# Patient Record
Sex: FEMALE | Race: WHITE | NOT HISPANIC OR LATINO | Employment: PART TIME | URBAN - NONMETROPOLITAN AREA
[De-identification: names, ages, dates, MRNs, and addresses within clinical notes are randomized per-mention and may not be internally consistent; named-entity substitution may affect disease eponyms.]

---
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
## 2020-01-08 ENCOUNTER — APPOINTMENT (OUTPATIENT)
Dept: GENERAL RADIOLOGY | Facility: HOSPITAL | Age: 20
End: 2020-01-08

## 2020-01-08 ENCOUNTER — HOSPITAL ENCOUNTER (EMERGENCY)
Facility: HOSPITAL | Age: 20
Discharge: HOME | End: 2020-01-09
Attending: EMERGENCY MEDICINE | Admitting: EMERGENCY MEDICINE

## 2020-01-08 DIAGNOSIS — M94.0 COSTOCHONDRITIS: Primary | ICD-10-CM

## 2020-01-08 LAB
AMPHET+METHAMPHET UR QL: NEGATIVE
AMPHETAMINES UR QL: NEGATIVE
B-HCG UR QL: NEGATIVE
BACTERIA UR QL AUTO: NORMAL /HPF
BARBITURATES UR QL SCN: NEGATIVE
BASOPHILS # BLD AUTO: 0.04 10*3/MM3 (ref 0–0.2)
BASOPHILS NFR BLD AUTO: 0.4 % (ref 0–1.5)
BENZODIAZ UR QL SCN: NEGATIVE
BILIRUB UR QL STRIP: NEGATIVE
BUPRENORPHINE SERPL-MCNC: NEGATIVE NG/ML
CANNABINOIDS SERPL QL: NEGATIVE
CLARITY UR: CLEAR
COCAINE UR QL: NEGATIVE
COLOR UR: YELLOW
DEPRECATED RDW RBC AUTO: 37.4 FL (ref 37–54)
EOSINOPHIL # BLD AUTO: 0.1 10*3/MM3 (ref 0–0.4)
EOSINOPHIL NFR BLD AUTO: 1 % (ref 0.3–6.2)
ERYTHROCYTE [DISTWIDTH] IN BLOOD BY AUTOMATED COUNT: 11.9 % (ref 12.3–15.4)
GLUCOSE UR STRIP-MCNC: NEGATIVE MG/DL
HCT VFR BLD AUTO: 36.4 % (ref 34–46.6)
HGB BLD-MCNC: 12.4 G/DL (ref 12–15.9)
HGB UR QL STRIP.AUTO: NEGATIVE
HYALINE CASTS UR QL AUTO: NORMAL /LPF
IMM GRANULOCYTES # BLD AUTO: 0.04 10*3/MM3 (ref 0–0.05)
IMM GRANULOCYTES NFR BLD AUTO: 0.4 % (ref 0–0.5)
KETONES UR QL STRIP: NEGATIVE
LEUKOCYTE ESTERASE UR QL STRIP.AUTO: ABNORMAL
LYMPHOCYTES # BLD AUTO: 2.64 10*3/MM3 (ref 0.7–3.1)
LYMPHOCYTES NFR BLD AUTO: 25.5 % (ref 19.6–45.3)
MCH RBC QN AUTO: 29.6 PG (ref 26.6–33)
MCHC RBC AUTO-ENTMCNC: 34.1 G/DL (ref 31.5–35.7)
MCV RBC AUTO: 86.9 FL (ref 79–97)
METHADONE UR QL SCN: NEGATIVE
MONOCYTES # BLD AUTO: 0.57 10*3/MM3 (ref 0.1–0.9)
MONOCYTES NFR BLD AUTO: 5.5 % (ref 5–12)
NEUTROPHILS # BLD AUTO: 6.96 10*3/MM3 (ref 1.7–7)
NEUTROPHILS NFR BLD AUTO: 67.2 % (ref 42.7–76)
NITRITE UR QL STRIP: NEGATIVE
NRBC BLD AUTO-RTO: 0 /100 WBC (ref 0–0.2)
OPIATES UR QL: NEGATIVE
OXYCODONE UR QL SCN: NEGATIVE
PCP UR QL SCN: NEGATIVE
PH UR STRIP.AUTO: 6.5 [PH] (ref 5–9)
PLATELET # BLD AUTO: 295 10*3/MM3 (ref 140–450)
PMV BLD AUTO: 10.4 FL (ref 6–12)
PROPOXYPH UR QL: NEGATIVE
PROT UR QL STRIP: NEGATIVE
RBC # BLD AUTO: 4.19 10*6/MM3 (ref 3.77–5.28)
RBC # UR: NORMAL /HPF
REF LAB TEST METHOD: NORMAL
SP GR UR STRIP: 1.01 (ref 1–1.03)
SQUAMOUS #/AREA URNS HPF: NORMAL /HPF
TRICYCLICS UR QL SCN: NEGATIVE
UROBILINOGEN UR QL STRIP: ABNORMAL
WBC NRBC COR # BLD: 10.35 10*3/MM3 (ref 3.4–10.8)
WBC UR QL AUTO: NORMAL /HPF

## 2020-01-08 PROCEDURE — 85025 COMPLETE CBC W/AUTO DIFF WBC: CPT | Performed by: EMERGENCY MEDICINE

## 2020-01-08 PROCEDURE — 71046 X-RAY EXAM CHEST 2 VIEWS: CPT

## 2020-01-08 PROCEDURE — 80306 DRUG TEST PRSMV INSTRMNT: CPT | Performed by: EMERGENCY MEDICINE

## 2020-01-08 PROCEDURE — 96374 THER/PROPH/DIAG INJ IV PUSH: CPT

## 2020-01-08 PROCEDURE — 80053 COMPREHEN METABOLIC PANEL: CPT | Performed by: EMERGENCY MEDICINE

## 2020-01-08 PROCEDURE — 93010 ELECTROCARDIOGRAM REPORT: CPT | Performed by: INTERNAL MEDICINE

## 2020-01-08 PROCEDURE — 25010000002 KETOROLAC TROMETHAMINE PER 15 MG: Performed by: EMERGENCY MEDICINE

## 2020-01-08 PROCEDURE — 84484 ASSAY OF TROPONIN QUANT: CPT | Performed by: EMERGENCY MEDICINE

## 2020-01-08 PROCEDURE — 99283 EMERGENCY DEPT VISIT LOW MDM: CPT

## 2020-01-08 PROCEDURE — 93005 ELECTROCARDIOGRAM TRACING: CPT

## 2020-01-08 PROCEDURE — 81001 URINALYSIS AUTO W/SCOPE: CPT | Performed by: EMERGENCY MEDICINE

## 2020-01-08 PROCEDURE — 93005 ELECTROCARDIOGRAM TRACING: CPT | Performed by: EMERGENCY MEDICINE

## 2020-01-08 PROCEDURE — 81025 URINE PREGNANCY TEST: CPT | Performed by: EMERGENCY MEDICINE

## 2020-01-08 RX ORDER — SODIUM CHLORIDE 0.9 % (FLUSH) 0.9 %
10 SYRINGE (ML) INJECTION AS NEEDED
Status: DISCONTINUED | OUTPATIENT
Start: 2020-01-08 | End: 2020-01-09 | Stop reason: HOSPADM

## 2020-01-08 RX ORDER — KETOROLAC TROMETHAMINE 15 MG/ML
15 INJECTION, SOLUTION INTRAMUSCULAR; INTRAVENOUS ONCE
Status: COMPLETED | OUTPATIENT
Start: 2020-01-08 | End: 2020-01-08

## 2020-01-08 RX ADMIN — KETOROLAC TROMETHAMINE 15 MG: 15 INJECTION, SOLUTION INTRAMUSCULAR; INTRAVENOUS at 23:57

## 2020-01-08 RX ADMIN — Medication 10 ML: at 23:36

## 2020-01-09 VITALS
BODY MASS INDEX: 27.97 KG/M2 | RESPIRATION RATE: 18 BRPM | DIASTOLIC BLOOD PRESSURE: 68 MMHG | HEART RATE: 79 BPM | OXYGEN SATURATION: 98 % | HEIGHT: 62 IN | TEMPERATURE: 97.3 F | WEIGHT: 152 LBS | SYSTOLIC BLOOD PRESSURE: 109 MMHG

## 2020-01-09 LAB
ALBUMIN SERPL-MCNC: 4.3 G/DL (ref 3.5–5.2)
ALBUMIN/GLOB SERPL: 1.4 G/DL
ALP SERPL-CCNC: 61 U/L (ref 39–117)
ALT SERPL W P-5'-P-CCNC: 16 U/L (ref 1–33)
ANION GAP SERPL CALCULATED.3IONS-SCNC: 12 MMOL/L (ref 5–15)
AST SERPL-CCNC: 21 U/L (ref 1–32)
BILIRUB SERPL-MCNC: 0.2 MG/DL (ref 0.2–1.2)
BUN BLD-MCNC: 11 MG/DL (ref 6–20)
BUN/CREAT SERPL: 15.1 (ref 7–25)
CALCIUM SPEC-SCNC: 9.2 MG/DL (ref 8.6–10.5)
CHLORIDE SERPL-SCNC: 103 MMOL/L (ref 98–107)
CO2 SERPL-SCNC: 25 MMOL/L (ref 22–29)
CREAT BLD-MCNC: 0.73 MG/DL (ref 0.57–1)
GFR SERPL CREATININE-BSD FRML MDRD: 103 ML/MIN/1.73
GLOBULIN UR ELPH-MCNC: 3 GM/DL
GLUCOSE BLD-MCNC: 110 MG/DL (ref 65–99)
POTASSIUM BLD-SCNC: 3.8 MMOL/L (ref 3.5–5.2)
PROT SERPL-MCNC: 7.3 G/DL (ref 6–8.5)
SODIUM BLD-SCNC: 140 MMOL/L (ref 136–145)
TROPONIN T SERPL-MCNC: <0.01 NG/ML (ref 0–0.03)

## 2020-01-09 NOTE — ED PROVIDER NOTES
Subjective   19-year-old previously healthy female presents emergency department with complaint of midsternal chest pain that does not radiate.  Began a couple days ago and has been intermittent.  Recent URI symptoms.  Recent cough.  Concerned because of family history in her father heart disease.  Denies any shortness of breath.  States the chest hurts with pressure or breathing.  No known injury.  Denies pregnancy.  Denies nausea, vomiting, diarrhea, fever or chills.    Family history, surgical history, social history, current medications and allergies are reviewed with the patient and triage documentation and vitals are reviewed.        History provided by:  Patient   used: No        Review of Systems   Constitutional: Negative for chills, diaphoresis and fever.   HENT: Negative for congestion, ear pain, sinus pressure and sinus pain.    Eyes: Negative.    Respiratory: Negative for cough, shortness of breath and wheezing.    Cardiovascular: Positive for chest pain. Negative for palpitations and leg swelling.   Gastrointestinal: Negative for abdominal pain, constipation, diarrhea, nausea and vomiting.   Endocrine: Negative.    Genitourinary: Negative for dysuria, frequency and urgency.   Musculoskeletal: Negative for arthralgias, back pain, myalgias and neck pain.   Skin: Negative for color change, pallor, rash and wound.   Allergic/Immunologic: Negative.    Neurological: Negative.    Hematological: Negative.    Psychiatric/Behavioral: Negative.        Past Medical History:   Diagnosis Date   • Acute pharyngitis    • Acute sinusitis    • Allergic asthma     IgE-mediated allergic asthma   • Allergic rhinitis    • Allergic rhinitis due to pollen    • Attention deficit hyperactivity disorder    • Common cold    • Cough    • Diarrhea    • Foreign body of cornea, right     Foreign body - corneal right eye   • Headache     not ocular   • History of influenza    • History of otitis media    • Impacted  "cerumen, left ear    • Impetigo    • Myopia    • Nausea    • Nausea and vomiting    • Need for immunization against influenza     \"Needs influenza immunization\"   • Obstructive sleep apnea syndrome     Previous   • Pain in right wrist    • Pain in throat    • Rhinitis    • Rib pain    • Screening for mental retardation     Mental retardation screening - Extremely low level of intellectual functioning   • Upper respiratory infection    • Worried well        Allergies   Allergen Reactions   • Penicillins Rash       Past Surgical History:   Procedure Laterality Date   • TONSILLECTOMY AND ADENOIDECTOMY  04/24/2006    T&A (1) - Obstructive sleep apnea syndrome.       Family History   Problem Relation Age of Onset   • No Known Problems Mother    • Diabetes Father    • Hypertension Father    • Asthma Sister    • No Known Problems Brother    • No Known Problems Maternal Grandmother    • No Known Problems Maternal Grandfather    • Diabetes Paternal Grandmother    • Hypertension Paternal Grandfather        Social History     Socioeconomic History   • Marital status: Single     Spouse name: Not on file   • Number of children: Not on file   • Years of education: Not on file   • Highest education level: Not on file   Tobacco Use   • Smoking status: Never Smoker   • Smokeless tobacco: Never Used   Substance and Sexual Activity   • Alcohol use: No   • Drug use: No   • Sexual activity: Never           Objective   Physical Exam   Constitutional: She is oriented to person, place, and time. She appears well-developed and well-nourished.  Non-toxic appearance. She does not appear ill. No distress.   HENT:   Head: Normocephalic.   Eyes: Pupils are equal, round, and reactive to light.   Neck: Normal range of motion. Neck supple. No hepatojugular reflux and no JVD present.   Cardiovascular: Normal rate and regular rhythm.  No extrasystoles are present.   No murmur heard.  Pulses:       Radial pulses are 2+ on the right side, and 2+ on the " left side.        Dorsalis pedis pulses are 2+ on the right side, and 2+ on the left side.   Pulmonary/Chest: Effort normal and breath sounds normal. No accessory muscle usage. No respiratory distress. She has no decreased breath sounds. She has no wheezes. She has no rhonchi. She has no rales. She exhibits tenderness.   Abdominal: Soft. Bowel sounds are normal. There is no tenderness. There is no rebound and no guarding.   Musculoskeletal: Normal range of motion.        Right lower leg: Normal. She exhibits no tenderness and no edema.        Left lower leg: Normal. She exhibits no tenderness and no edema.   Neurological: She is alert and oriented to person, place, and time.   Skin: Skin is warm and dry. Capillary refill takes less than 2 seconds.   Psychiatric: She has a normal mood and affect. Her behavior is normal.   Nursing note and vitals reviewed.      Procedures  none         ED Course      Labs Reviewed   COMPREHENSIVE METABOLIC PANEL - Abnormal; Notable for the following components:       Result Value    Glucose 110 (*)     All other components within normal limits    Narrative:     GFR Normal >60  Chronic Kidney Disease <60  Kidney Failure <15     CBC WITH AUTO DIFFERENTIAL - Abnormal; Notable for the following components:    RDW 11.9 (*)     All other components within normal limits   URINALYSIS W/ MICROSCOPIC IF INDICATED (NO CULTURE) - Abnormal; Notable for the following components:    Leuk Esterase, UA Trace (*)     All other components within normal limits   TROPONIN (IN-HOUSE) - Normal    Narrative:     Troponin T Reference Range:  <= 0.03 ng/mL-   Negative for AMI  >0.03 ng/mL-     Abnormal for myocardial necrosis.  Clinicians would have to utilize clinical acumen, EKG, Troponin and serial changes to determine if it is an Acute Myocardial Infarction or myocardial injury due to an underlying chronic condition.       Results may be falsely decreased if patient taking Biotin.     PREGNANCY, URINE -  Normal   URINE DRUG SCREEN - Normal    Narrative:     Cutoff For Drugs Screened:    Amphetamines               500 ng/ml  Barbiturates               200 ng/ml  Benzodiazepines            150 ng/ml  Cocaine                    150 ng/ml  Methadone                  200 ng/ml  Opiates                    100 ng/ml  Phencyclidine               25 ng/ml  THC                            50 ng/ml  Methamphetamine            500 ng/ml  Tricyclic Antidepressants  300 ng/ml  Oxycodone                  100 ng/ml  Propoxyphene               300 ng/ml  Buprenorphine               10 ng/ml    The normal value for all drugs tested is negative. This report includes unconfirmed screening results, with the cutoff values listed, to be used for medical treatment purposes only.  Unconfirmed results must not be used for non-medical purposes such as employment or legal testing.  Clinical consideration should be applied to any drug of abuse test, particularly when unconfirmed results are used.     URINALYSIS, MICROSCOPIC ONLY   CBC AND DIFFERENTIAL    Narrative:     The following orders were created for panel order CBC & Differential.  Procedure                               Abnormality         Status                     ---------                               -----------         ------                     CBC Auto Differential[[id number]]        Abnormal            Final result                 Please view results for these tests on the individual orders.     Xr Chest 2 View    Result Date: 1/8/2020  Narrative: Exam: PA lateral chest INDICATION: Chest pain COMPARISON: 10/4/2018 FINDINGS: PA lateral chest. Mild thoracic dextroscoliosis. The cardiomediastinal silhouette is unremarkable. The lungs are clear. No pneumothorax or pleural effusion.     Impression: No acute cardiopulmonary pulmonary abnormality. Electronically signed by:  Rainer Jacobsen MD  1/8/2020 11:58 PM CST Workstation: [id number]    EKG January 8, 2020 at 2135 reveals normal sinus  rhythm rate 88 bpm.  T wave flattening in 3 and aVF as well as V3 without T wave inversion.  No [place] depression.  No evidence of acute ischemia.          MDM  Number of Diagnoses or Management Options  Costochondritis:      Amount and/or Complexity of Data Reviewed  Clinical lab tests: reviewed  Tests in the radiology section of CPT®: reviewed  Tests in the medicine section of CPT®: reviewed    Patient Progress  Patient progress: stable    Laboratory studies and imaging unremarkable.  EKG normal.  Patient's pain reproducible to palpation.  Likely costochondritis given recent URI and cough.  Advised on symptomatic treatment with NSAIDs and moist heat.  Agreeable to discharge.    Final diagnoses:   Costochondritis            Domingo Shahid,   01/09/20 0652

## 2020-01-09 NOTE — DISCHARGE INSTRUCTIONS
Please return with new or worsening symptoms.  Use ibuprofen and tylenol as well as heating pad to help with discomfort.  Follow-up with primary care.

## 2020-01-20 ENCOUNTER — OFFICE VISIT (OUTPATIENT)
Dept: FAMILY MEDICINE CLINIC | Facility: CLINIC | Age: 20
End: 2020-01-20

## 2020-01-20 VITALS
OXYGEN SATURATION: 99 % | SYSTOLIC BLOOD PRESSURE: 110 MMHG | TEMPERATURE: 99.8 F | WEIGHT: 153 LBS | DIASTOLIC BLOOD PRESSURE: 70 MMHG | BODY MASS INDEX: 28.16 KG/M2 | HEART RATE: 84 BPM | HEIGHT: 62 IN

## 2020-01-20 DIAGNOSIS — Z23 FLU VACCINE NEED: ICD-10-CM

## 2020-01-20 DIAGNOSIS — Z30.9 ENCOUNTER FOR CONTRACEPTIVE MANAGEMENT, UNSPECIFIED TYPE: ICD-10-CM

## 2020-01-20 DIAGNOSIS — J06.9 UPPER RESPIRATORY TRACT INFECTION, UNSPECIFIED TYPE: ICD-10-CM

## 2020-01-20 DIAGNOSIS — G44.89 HEADACHE SYNDROME: Primary | ICD-10-CM

## 2020-01-20 PROCEDURE — 96372 THER/PROPH/DIAG INJ SC/IM: CPT | Performed by: STUDENT IN AN ORGANIZED HEALTH CARE EDUCATION/TRAINING PROGRAM

## 2020-01-20 PROCEDURE — 99213 OFFICE O/P EST LOW 20 MIN: CPT | Performed by: STUDENT IN AN ORGANIZED HEALTH CARE EDUCATION/TRAINING PROGRAM

## 2020-01-20 PROCEDURE — 90471 IMMUNIZATION ADMIN: CPT | Performed by: STUDENT IN AN ORGANIZED HEALTH CARE EDUCATION/TRAINING PROGRAM

## 2020-01-20 PROCEDURE — 90674 CCIIV4 VAC NO PRSV 0.5 ML IM: CPT | Performed by: STUDENT IN AN ORGANIZED HEALTH CARE EDUCATION/TRAINING PROGRAM

## 2020-01-20 RX ORDER — MEDROXYPROGESTERONE ACETATE 150 MG/ML
150 INJECTION, SUSPENSION INTRAMUSCULAR ONCE
Status: COMPLETED | OUTPATIENT
Start: 2020-01-20 | End: 2020-01-20

## 2020-01-20 RX ORDER — AZITHROMYCIN 250 MG/1
TABLET, FILM COATED ORAL
Qty: 6 TABLET | Refills: 0 | OUTPATIENT
Start: 2020-01-20 | End: 2020-01-29

## 2020-01-20 RX ADMIN — MEDROXYPROGESTERONE ACETATE 150 MG: 150 INJECTION, SUSPENSION INTRAMUSCULAR at 10:44

## 2020-01-20 NOTE — PROGRESS NOTES
I have seen the patient.  I have reviewed the notes, assessments, and/or procedures performed by Mile Mcmanus MD, I concur with her/his documentation and assessment and plan for Rubi Hennessy.               This document has been electronically signed by Saroj Bhatti MD on January 20, 2020 1:52 PM

## 2020-01-20 NOTE — PROGRESS NOTES
"     Family Medicine Residency   Mile Mcmanus MD    Rubi Hennessy is a 19 y.o. female who presents to family medicine residency clinic for the following:    She is here today for follow-up for migraines without aura. She has been taking verapamil and her headaches are well controlled. She is not having to use Maxalt. She has a headache today that she states is because she broke her glasses and has not had them.     She is now sexually active. She cannot remember to take OCP, so she would like to try the Depo shot. She does not want to be screened for any sexually transmitted diseases today. She reports one lifetime partner.     She has had cough/congestion for 1-2 weeks. She has been to the urgent care and given cough medicine. Sister is also sick. She has tried humidifier. Has had some nose bleeds. No fevers, but she does endorse chills.       Past Medical Hx:   Past Medical History:   Diagnosis Date   • Acute pharyngitis    • Acute sinusitis    • Allergic asthma     IgE-mediated allergic asthma   • Allergic rhinitis    • Allergic rhinitis due to pollen    • Attention deficit hyperactivity disorder    • Common cold    • Cough    • Diarrhea    • Foreign body of cornea, right     Foreign body - corneal right eye   • Headache     not ocular   • History of influenza    • History of otitis media    • Impacted cerumen, left ear    • Impetigo    • Myopia    • Nausea    • Nausea and vomiting    • Need for immunization against influenza     \"Needs influenza immunization\"   • Obstructive sleep apnea syndrome     Previous   • Pain in right wrist    • Pain in throat    • Rhinitis    • Rib pain    • Screening for mental retardation     Mental retardation screening - Extremely low level of intellectual functioning   • Upper respiratory infection    • Worried well        Past Surgical Hx:  Past Surgical History:   Procedure Laterality Date   • TONSILLECTOMY AND ADENOIDECTOMY  04/24/2006    T&A (1) - " Obstructive sleep apnea syndrome.       Current Meds:    Current Outpatient Medications:   •  ipratropium (ATROVENT) 0.06 % nasal spray, 2 sprays each nostril 3-4 times a day for cold symptoms., Disp: 1 each, Rfl: 0  •  ondansetron ODT (ZOFRAN-ODT) 4 MG disintegrating tablet, Take 1 tablet by mouth Every 8 (Eight) Hours As Needed for Nausea for up to 8 doses., Disp: 8 tablet, Rfl: 0  •  rizatriptan (MAXALT) 5 MG tablet, Take 1 tablet by mouth 1 (One) Time As Needed for Migraine for up to 1 dose. May repeat in 2 hours if needed, Disp: 9 tablet, Rfl: 0  •  verapamil SR (CALAN-SR) 120 MG CR tablet, Take 1 tablet by mouth Every Night., Disp: 30 tablet, Rfl: 0  •  azithromycin (ZITHROMAX) 250 MG tablet, Take 2 tablets the first day, then 1 tablet daily for 4 days., Disp: 6 tablet, Rfl: 0    Current Facility-Administered Medications:   •  medroxyPROGESTERone (DEPO-PROVERA) injection 150 mg, 150 mg, Intramuscular, Once, Mile Mcmanus MD    Allergies:  Penicillins    Family Hx:  Family History   Problem Relation Age of Onset   • No Known Problems Mother    • Diabetes Father    • Hypertension Father    • Asthma Sister    • No Known Problems Brother    • No Known Problems Maternal Grandmother    • No Known Problems Maternal Grandfather    • Diabetes Paternal Grandmother    • Hypertension Paternal Grandfather         Social History:  Social History     Socioeconomic History   • Marital status: Single     Spouse name: Not on file   • Number of children: Not on file   • Years of education: Not on file   • Highest education level: Not on file   Tobacco Use   • Smoking status: Never Smoker   • Smokeless tobacco: Never Used   Substance and Sexual Activity   • Alcohol use: No   • Drug use: No   • Sexual activity: Never       Review of Systems  Review of Systems   Constitutional: Negative for chills, diaphoresis and fever.   HENT: Positive for congestion and sore throat. Negative for sneezing.    Eyes: Negative for pain and  discharge.   Respiratory: Positive for cough. Negative for shortness of breath.    Gastrointestinal: Negative for constipation, diarrhea, nausea and vomiting.   Endocrine: Negative for cold intolerance and heat intolerance.   Genitourinary: Negative for difficulty urinating, dysuria, frequency and urgency.   Musculoskeletal: Negative for arthralgias and myalgias.   Skin: Negative for color change and pallor.   Allergic/Immunologic: Negative for environmental allergies and food allergies.   Neurological: Positive for headaches. Negative for dizziness, syncope and weakness.   Psychiatric/Behavioral: Negative for confusion and sleep disturbance.       Physical Exam:  Vitals:    01/20/20 0916   BP: 110/70   Pulse: 84   Temp: 99.8 °F (37.7 °C)   SpO2: 99%       Body mass index is 27.98 kg/m².   Physical Exam   Constitutional: She is oriented to person, place, and time. She appears well-developed and well-nourished. No distress.   HENT:   Head: Normocephalic and atraumatic.   Nose: Nose normal.   Mouth/Throat: Posterior oropharyngeal erythema present.   Eyes: Conjunctivae and EOM are normal.   Neck: Normal range of motion. Neck supple.   Cardiovascular: Normal rate, regular rhythm and normal heart sounds.   No murmur heard.  Pulmonary/Chest: Effort normal and breath sounds normal. No respiratory distress. She has no wheezes. She has no rales.   Abdominal: Soft. Bowel sounds are normal. She exhibits no distension. There is no tenderness. There is no guarding.   Musculoskeletal: Normal range of motion.   Neurological: She is alert and oriented to person, place, and time.   Skin: Skin is warm and dry.   Psychiatric: She has a normal mood and affect. Her behavior is normal.   Vitals reviewed.        Data Reviewed:     LABS:   Lab Results   Component Value Date    GLUCOSE 110 (H) 01/08/2020    BUN 11 01/08/2020    CREATININE 0.73 01/08/2020    EGFRIFNONA 103 01/08/2020    BCR 15.1 01/08/2020    K 3.8 01/08/2020    CO2 25.0  01/08/2020    CALCIUM 9.2 01/08/2020    ALBUMIN 4.30 01/08/2020    AST 21 01/08/2020    ALT 16 01/08/2020     Lab Results   Component Value Date    WBC 10.35 01/08/2020    HGB 12.4 01/08/2020    HCT 36.4 01/08/2020    MCV 86.9 01/08/2020     01/08/2020     No results found for: CHOL, CHLPL, TRIG, HDL, LDL, LDLDIRECT  No results found for: TSH, J9VRZBF, H6MJMCX, THYROIDAB  No results found for: HGBA1C  Lab Results   Component Value Date    CREATININE 0.73 01/08/2020     No results found for: IRON, TIBC, FERRITIN    Assessment/Plan     Migraines  -Continue Maxalt as needed  -Continue preventive medicine verapamil  -Limit caffeine intake  -Follow-up if symptoms persist or worsen    Birth Control  -pregnancy test today and then Depo shot    Upper Respiratory Illness  -given duration of symptoms, will give azithromycin to take     Weight  -Class: Overweight: 25.0-29.9kg/m2   -Patient's Body mass index is 27.98 kg/m². BMI is within normal parameters. No follow-up required..      Nicotine status  reports that she has never smoked. She has never used smokeless tobacco.    Alcohol use:  reports that she does not drink alcohol.    Health Maintenance  Health Maintenance   Topic Date Due   • ANNUAL PHYSICAL  12/07/2003   • TDAP/TD VACCINES (2 - Tdap) 12/07/2011   • CHLAMYDIA SCREENING  04/21/2017   • HPV VACCINES (3 - Female 3-dose series) 04/12/2019   • INFLUENZA VACCINE  08/01/2019   • MENINGOCOCCAL VACCINE (Normal Risk)  Completed       FOLLOW-UP  Return in about 3 months (around 4/20/2020) for Recheck.      ORDERS  There are no discontinued medications.  Rubi was seen today for shortness of breath, nasal congestion, headache and cough.    Diagnoses and all orders for this visit:    Headache syndrome    Flu vaccine need  -     Flucelvax Quad=>4Years ([id number])    Encounter for contraceptive management, unspecified type  -     Pregnancy, Urine - Urine, Clean Catch; Future  -     medroxyPROGESTERone (DEPO-PROVERA)  injection 150 mg    Upper respiratory tract infection, unspecified type    Other orders  -     azithromycin (ZITHROMAX) 250 MG tablet; Take 2 tablets the first day, then 1 tablet daily for 4 days.        Goals:   Goals        Patient Stated    • Reduce coffee intake  (pt-stated)      Barriers: none            RISK SCORE: 2        This document has been electronically signed by Mile Mcmanus MD on January 20, 2020 9:59 AM

## 2020-01-21 ENCOUNTER — TELEPHONE (OUTPATIENT)
Dept: FAMILY MEDICINE CLINIC | Facility: CLINIC | Age: 20
End: 2020-01-21

## 2020-01-21 NOTE — TELEPHONE ENCOUNTER
Pt's father called and the medication azithromycin (ZITHROMAX) 250 MG tablet was called into [facility] in [place] and they were busy yesterday and they didn't get their medication until later on and was wondering if Dr. Mcmanus would extend their school excuse until today and go back tomorrow. His number to call back is [phone].      Thank you,      Alma

## 2020-01-22 NOTE — TELEPHONE ENCOUNTER
Attempted to call patient on all numbers listed and unable to get ahold of anyone. Call went straight to an automated message, and unable to leave a VM.    Thanks,  Mellisa

## 2020-03-26 ENCOUNTER — TELEPHONE (OUTPATIENT)
Dept: FAMILY MEDICINE CLINIC | Facility: CLINIC | Age: 20
End: 2020-03-26

## 2020-03-26 NOTE — TELEPHONE ENCOUNTER
TRIED TO REACH PATIENTS FATHER TO RESCHEDULE FROM 04/21; UNABLE TO REACH AND NO VM.      THANK YOU,      CHAPARRITA

## 2021-08-02 PROCEDURE — 87635 SARS-COV-2 COVID-19 AMP PRB: CPT | Performed by: NURSE PRACTITIONER

## 2022-04-25 ENCOUNTER — OFFICE VISIT (OUTPATIENT)
Dept: FAMILY MEDICINE CLINIC | Facility: CLINIC | Age: 22
End: 2022-04-25

## 2022-04-25 VITALS
OXYGEN SATURATION: 98 % | DIASTOLIC BLOOD PRESSURE: 70 MMHG | SYSTOLIC BLOOD PRESSURE: 122 MMHG | WEIGHT: 164.6 LBS | HEART RATE: 74 BPM | HEIGHT: 62 IN | BODY MASS INDEX: 30.29 KG/M2

## 2022-04-25 DIAGNOSIS — Z30.8 ENCOUNTER FOR OTHER CONTRACEPTIVE MANAGEMENT: ICD-10-CM

## 2022-04-25 DIAGNOSIS — G43.111 INTRACTABLE MIGRAINE WITH AURA WITH STATUS MIGRAINOSUS: Primary | ICD-10-CM

## 2022-04-25 LAB
B-HCG UR QL: NEGATIVE
EXPIRATION DATE: NORMAL
INTERNAL NEGATIVE CONTROL: NEGATIVE
INTERNAL POSITIVE CONTROL: POSITIVE
Lab: NORMAL

## 2022-04-25 PROCEDURE — 81025 URINE PREGNANCY TEST: CPT | Performed by: STUDENT IN AN ORGANIZED HEALTH CARE EDUCATION/TRAINING PROGRAM

## 2022-04-25 PROCEDURE — 99213 OFFICE O/P EST LOW 20 MIN: CPT | Performed by: STUDENT IN AN ORGANIZED HEALTH CARE EDUCATION/TRAINING PROGRAM

## 2022-04-25 RX ORDER — IBUPROFEN 200 MG
200 TABLET ORAL EVERY 6 HOURS PRN
COMMUNITY
End: 2022-09-10

## 2022-04-25 RX ORDER — ACETAMINOPHEN, ASPIRIN AND CAFFEINE 250; 250; 65 MG/1; MG/1; MG/1
1 TABLET, FILM COATED ORAL EVERY 6 HOURS PRN
COMMUNITY

## 2022-04-25 RX ORDER — RIMEGEPANT SULFATE 75 MG/75MG
75 TABLET, ORALLY DISINTEGRATING ORAL DAILY PRN
Qty: 9 TABLET | Refills: 3 | Status: SHIPPED | OUTPATIENT
Start: 2022-04-25 | End: 2022-05-25

## 2022-04-25 RX ORDER — MEDROXYPROGESTERONE ACETATE 150 MG/ML
150 INJECTION, SUSPENSION INTRAMUSCULAR ONCE
Status: COMPLETED | OUTPATIENT
Start: 2022-04-25 | End: 2022-04-25

## 2022-04-25 RX ADMIN — MEDROXYPROGESTERONE ACETATE 150 MG: 150 INJECTION, SUSPENSION INTRAMUSCULAR at 14:51

## 2022-04-25 NOTE — PROGRESS NOTES
"  Family Medicine Residency  Hunter Nicholas MD    Subjective:     Rubi Hennessy is a 21 y.o. female who presents for migraines, and need for depo provera injection.    She has light-triggered migraines. She endorses migraines daily with exposure to bright lights. Having a bright light shone in her eyes cause her immediate head pain. She was using maxalt and now it is not working. She is taking excedrin and advil for migraines. She takes these medications daily and will repeat doses every couple of hours and then lays in a dark quiet room and goes to sleep. She has failed verapamil, imitrex and elavil in the past.     The following portions of the patient's history were reviewed and updated as appropriate: allergies, current medications, past family history, past medical history, past social history, past surgical history and problem list.    Past Medical Hx:  Past Medical History:   Diagnosis Date   • Acute pharyngitis    • Acute sinusitis    • Allergic asthma     IgE-mediated allergic asthma   • Allergic rhinitis    • Allergic rhinitis due to pollen    • Attention deficit hyperactivity disorder    • Common cold    • Cough    • Diarrhea    • Foreign body of cornea, right     Foreign body - corneal right eye   • Headache     not ocular   • History of influenza    • History of otitis media    • Impacted cerumen, left ear    • Impetigo    • Myopia    • Nausea    • Nausea and vomiting    • Need for immunization against influenza     \"Needs influenza immunization\"   • Obstructive sleep apnea syndrome     Previous   • Pain in right wrist    • Pain in throat    • Rhinitis    • Rib pain    • Screening for mental retardation     Mental retardation screening - Extremely low level of intellectual functioning   • Upper respiratory infection    • Worried well        Past Surgical Hx:  Past Surgical History:   Procedure Laterality Date   • TONSILLECTOMY AND ADENOIDECTOMY  04/24/2006    T&A (1) - Obstructive sleep apnea " syndrome.       Current Meds:    Current Outpatient Medications:   •  aspirin-acetaminophen-caffeine (EXCEDRIN MIGRAINE) 250-250-65 MG per tablet, Take 1 tablet by mouth Every 6 (Six) Hours As Needed for Headache., Disp: , Rfl:   •  ibuprofen (ADVIL,MOTRIN) 200 MG tablet, Take 200 mg by mouth Every 6 (Six) Hours As Needed for Mild Pain ., Disp: , Rfl:   •  brompheniramine-pseudoephedrine-DM (Bromfed DM) 30-2-10 MG/5ML syrup, Take 5 mL by mouth Every 4 (Four) Hours As Needed for Congestion, Cough or Allergies., Disp: 120 mL, Rfl: 0  •  Galcanezumab-gnlm 120 MG/ML solution prefilled syringe, Inject 1 mL under the skin into the appropriate area as directed Every 30 (Thirty) Days for 30 days., Disp: 1.12 mL, Rfl: 3  •  Rimegepant Sulfate (Nurtec) 75 MG tablet dispersible tablet, Take 1 tablet by mouth Daily As Needed (migraine) for up to 30 days., Disp: 9 tablet, Rfl: 3  No current facility-administered medications for this visit.    Allergies:  Allergies   Allergen Reactions   • Penicillins Rash       Family Hx:  Family History   Problem Relation Age of Onset   • No Known Problems Mother    • Diabetes Father    • Hypertension Father    • Asthma Sister    • No Known Problems Brother    • No Known Problems Maternal Grandmother    • No Known Problems Maternal Grandfather    • Diabetes Paternal Grandmother    • Hypertension Paternal Grandfather         Social History:  Social History     Socioeconomic History   • Marital status: Single   Tobacco Use   • Smoking status: Never Smoker   • Smokeless tobacco: Never Used   Substance and Sexual Activity   • Alcohol use: No   • Drug use: No   • Sexual activity: Never       Review of Systems  Review of Systems   Constitutional: Negative for activity change and appetite change.   HENT: Negative for congestion and ear pain.    Eyes: Positive for photophobia. Negative for pain and discharge.   Respiratory: Negative for chest tightness and shortness of breath.    Cardiovascular:  "Negative for chest pain and palpitations.   Gastrointestinal: Positive for nausea. Negative for abdominal distention and abdominal pain.   Endocrine: Negative for cold intolerance and heat intolerance.   Genitourinary: Negative for difficulty urinating and dysuria.   Musculoskeletal: Negative for arthralgias and back pain.   Skin: Negative for color change and rash.   Allergic/Immunologic: Negative for environmental allergies and food allergies.   Neurological: Positive for headaches. Negative for dizziness.   Hematological: Negative for adenopathy. Does not bruise/bleed easily.   Psychiatric/Behavioral: Negative for agitation and confusion.       Objective:     /70   Pulse 74   Ht 157.5 cm (62\")   Wt 74.7 kg (164 lb 9.6 oz)   SpO2 98%   BMI 30.11 kg/m²   Physical Exam  Vitals and nursing note reviewed.   Constitutional:       Appearance: She is well-developed.   HENT:      Head: Normocephalic and atraumatic.   Eyes:      Pupils: Pupils are equal, round, and reactive to light.   Neck:      Thyroid: No thyromegaly.      Vascular: No JVD.      Trachea: No tracheal deviation.   Cardiovascular:      Rate and Rhythm: Normal rate.      Pulses:           Radial pulses are 2+ on the right side and 2+ on the left side.        Dorsalis pedis pulses are 2+ on the right side and 2+ on the left side.      Heart sounds: Normal heart sounds, S1 normal and S2 normal.   Pulmonary:      Effort: Pulmonary effort is normal.      Breath sounds: Normal breath sounds.   Abdominal:      General: Bowel sounds are normal.   Musculoskeletal:         General: Normal range of motion.   Skin:     General: Skin is warm and dry.      Capillary Refill: Capillary refill takes 2 to 3 seconds.   Neurological:      General: No focal deficit present.      Mental Status: She is alert and oriented to person, place, and time. Mental status is at baseline.      GCS: GCS eye subscore is 4. GCS verbal subscore is 5. GCS motor subscore is 6.      " Cranial Nerves: No cranial nerve deficit.      Sensory: No sensory deficit.   Psychiatric:         Speech: Speech normal.         Behavior: Behavior normal.         Thought Content: Thought content normal.          Assessment/Plan:     Diagnoses and all orders for this visit:    1. Intractable migraine with aura with status migrainosus (Primary)  -     Galcanezumab-gnlm 120 MG/ML solution prefilled syringe; Inject 1 mL under the skin into the appropriate area as directed Every 30 (Thirty) Days for 30 days.  Dispense: 1.12 mL; Refill: 3  -     Rimegepant Sulfate (Nurtec) 75 MG tablet dispersible tablet; Take 1 tablet by mouth Daily As Needed (migraine) for up to 30 days.  Dispense: 9 tablet; Refill: 3    2. Encounter for other contraceptive management  -     POCT pregnancy, urine  -     medroxyPROGESTERone Acetate suspension prefilled syringe 150 mg    Given patient's previous medication failures, will order Emgality and Nurtec.  Depo-Provera given today for birth control after negative UA.  Will work with clinical pharmacist to get these medications prior authorized.    · Rx changes: Add Emgality, add Nurtec  · Patient Education: See above  · Compliance at present is estimated to be good.   ·          Follow-up:     Return in about 4 weeks (around 5/23/2022) for Recheck migraines.    Preventative:  Health Maintenance   Topic Date Due   • ANNUAL PHYSICAL  Never done   • COVID-19 Vaccine (1) Never done   • HEPATITIS C SCREENING  Never done   • CHLAMYDIA SCREENING  Never done   • PAP SMEAR  Never done   • HPV VACCINES (3 - 3-dose series) 04/12/2019   • INFLUENZA VACCINE  08/01/2022   • TDAP/TD VACCINES (2 - Tdap) 03/19/2024   • MENINGOCOCCAL VACCINE  Completed   • Pneumococcal Vaccine 0-64  Aged Out         Weight      Alcohol use:  reports no history of alcohol use.  Nicotine status  reports that she has never smoked. She has never used smokeless tobacco.     Goals     •  Reduce coffee intake  (pt-stated)        Barriers: none              RISK SCORE: 4      This document has been electronically signed by Hunter Nicholas MD on April 26, 2022 07:58 CDT    Hunter Nicholas MD PGY-3  Part of this note may be an electronic transcription/translation of spoken language to printed text using the Dragon Dictation System.

## 2022-04-25 NOTE — PATIENT INSTRUCTIONS
When you get the emgality and nurtec from the pharmacy, call here and bring in your emgality and we will help you with your injection.

## 2022-04-26 NOTE — PROGRESS NOTES
I have seen this patient and discussed the case with the resident and agree with the assessment and plan.  SHA Reyes M.D.

## 2022-05-25 ENCOUNTER — OFFICE VISIT (OUTPATIENT)
Dept: FAMILY MEDICINE CLINIC | Facility: CLINIC | Age: 22
End: 2022-05-25

## 2022-05-25 VITALS
OXYGEN SATURATION: 98 % | DIASTOLIC BLOOD PRESSURE: 70 MMHG | HEART RATE: 85 BPM | BODY MASS INDEX: 30.67 KG/M2 | WEIGHT: 166.7 LBS | SYSTOLIC BLOOD PRESSURE: 122 MMHG | HEIGHT: 62 IN | TEMPERATURE: 98.2 F

## 2022-05-25 DIAGNOSIS — G43.111 INTRACTABLE MIGRAINE WITH AURA WITH STATUS MIGRAINOSUS: Primary | ICD-10-CM

## 2022-05-25 PROCEDURE — 99213 OFFICE O/P EST LOW 20 MIN: CPT | Performed by: STUDENT IN AN ORGANIZED HEALTH CARE EDUCATION/TRAINING PROGRAM

## 2022-05-25 NOTE — PROGRESS NOTES
"  Family Medicine Residency  Rita Peterson MD    Subjective:     Rubi Hennessy is a 21 y.o. female who presents for management of her migraines.  She states that she still having migraines about every other day.  She states her last migraine was about 2 days ago when she had to leave work.  He has brought in her Emgality medication because she wanted to know how to use it.    The following portions of the patient's history were reviewed and updated as appropriate: allergies, current medications, past family history, past medical history, past social history, past surgical history and problem list.    Past Medical Hx:  Past Medical History:   Diagnosis Date   • Acute pharyngitis    • Acute sinusitis    • Allergic asthma     IgE-mediated allergic asthma   • Allergic rhinitis    • Allergic rhinitis due to pollen    • Attention deficit hyperactivity disorder    • Common cold    • Cough    • Diarrhea    • Foreign body of cornea, right     Foreign body - corneal right eye   • Headache     not ocular   • History of influenza    • History of otitis media    • Impacted cerumen, left ear    • Impetigo    • Myopia    • Nausea    • Nausea and vomiting    • Need for immunization against influenza     \"Needs influenza immunization\"   • Obstructive sleep apnea syndrome     Previous   • Pain in right wrist    • Pain in throat    • Rhinitis    • Rib pain    • Screening for mental retardation     Mental retardation screening - Extremely low level of intellectual functioning   • Upper respiratory infection    • Worried well        Past Surgical Hx:  Past Surgical History:   Procedure Laterality Date   • TONSILLECTOMY AND ADENOIDECTOMY  04/24/2006    T&A (1) - Obstructive sleep apnea syndrome.       Current Meds:    Current Outpatient Medications:   •  aspirin-acetaminophen-caffeine (EXCEDRIN MIGRAINE) 250-250-65 MG per tablet, Take 1 tablet by mouth Every 6 (Six) Hours As Needed for Headache., Disp: , Rfl:   •  " "brompheniramine-pseudoephedrine-DM (Bromfed DM) 30-2-10 MG/5ML syrup, Take 5 mL by mouth Every 4 (Four) Hours As Needed for Congestion, Cough or Allergies., Disp: 120 mL, Rfl: 0  •  Galcanezumab-gnlm 120 MG/ML solution prefilled syringe, Inject 1 mL under the skin into the appropriate area as directed Every 30 (Thirty) Days for 30 days., Disp: 1.12 mL, Rfl: 3  •  ibuprofen (ADVIL,MOTRIN) 200 MG tablet, Take 200 mg by mouth Every 6 (Six) Hours As Needed for Mild Pain ., Disp: , Rfl:   •  Rimegepant Sulfate (Nurtec) 75 MG tablet dispersible tablet, Take 1 tablet by mouth Daily As Needed (migraine) for up to 30 days., Disp: 9 tablet, Rfl: 3    Allergies:  Allergies   Allergen Reactions   • Penicillins Rash       Family Hx:  Family History   Problem Relation Age of Onset   • No Known Problems Mother    • Diabetes Father    • Hypertension Father    • Asthma Sister    • No Known Problems Brother    • No Known Problems Maternal Grandmother    • No Known Problems Maternal Grandfather    • Diabetes Paternal Grandmother    • Hypertension Paternal Grandfather         Social History:  Social History     Socioeconomic History   • Marital status: Single   Tobacco Use   • Smoking status: Never Smoker   • Smokeless tobacco: Never Used   Substance and Sexual Activity   • Alcohol use: No   • Drug use: No   • Sexual activity: Never       Review of Systems  Review of Systems   Constitutional: Negative for fatigue.   Respiratory: Negative for shortness of breath.    Cardiovascular: Negative for chest pain and leg swelling.   Gastrointestinal: Negative for abdominal pain, constipation, diarrhea and nausea.   Endocrine: Negative for cold intolerance.   Genitourinary: Negative for menstrual problem.   Skin: Negative for rash.   Neurological: Positive for headaches. Negative for weakness, light-headedness and numbness.       Objective:     /70   Pulse 85   Temp 98.2 °F (36.8 °C)   Ht 157.5 cm (62\")   Wt 75.6 kg (166 lb 11.2 oz)   " SpO2 98%   BMI 30.49 kg/m²   Physical Exam  Vitals reviewed.   Constitutional:       Appearance: Normal appearance.   HENT:      Head: Normocephalic and atraumatic.      Right Ear: Tympanic membrane normal.      Left Ear: Tympanic membrane normal.      Nose: Nose normal.      Mouth/Throat:      Mouth: Mucous membranes are moist.   Eyes:      Pupils: Pupils are equal, round, and reactive to light.   Cardiovascular:      Rate and Rhythm: Normal rate.      Pulses: Normal pulses.      Heart sounds: Normal heart sounds.   Pulmonary:      Effort: Pulmonary effort is normal.      Breath sounds: Normal breath sounds.   Abdominal:      General: Abdomen is flat. Bowel sounds are normal.      Palpations: Abdomen is soft.   Musculoskeletal:         General: Normal range of motion.      Cervical back: Normal range of motion and neck supple.   Skin:     General: Skin is warm and dry.      Capillary Refill: Capillary refill takes less than 2 seconds.   Neurological:      General: No focal deficit present.      Mental Status: She is alert and oriented to person, place, and time. Mental status is at baseline.   Psychiatric:         Mood and Affect: Mood normal.          Assessment/Plan:     Diagnoses and all orders for this visit:    1. Intractable migraine with aura with status migrainosus (Primary)    Patient has had her Emgality medication shot given to her in the office today.  She was showed how to use the medication properly.  Patient was informed that if she has any questions she can come back to the office to get help injecting the medication.  We have informed the patient to follow back in about 4 weeks.  To see if she has had any improvement in the number of migraine she has had    · Rx changes: n/a  · Patient Education: n/a  · Compliance at present is estimated to be good.   · Efforts to improve compliance (if necessary) will be directed at increased exercise.       Follow-up:     Return in about 4 weeks (around  6/22/2022).    Preventative:  Health Maintenance   Topic Date Due   • COVID-19 Vaccine (1) Never done   • HEPATITIS C SCREENING  Never done   • ANNUAL WELLNESS VISIT  Never done   • CHLAMYDIA SCREENING  Never done   • PAP SMEAR  Never done   • HPV VACCINES (3 - 3-dose series) 04/12/2019   • INFLUENZA VACCINE  08/01/2022   • TDAP/TD VACCINES (2 - Tdap) 03/19/2024   • MENINGOCOCCAL VACCINE  Completed   • Pneumococcal Vaccine 0-64  Aged Out         Alcohol use:  reports no history of alcohol use.  Nicotine status  reports that she has never smoked. She has never used smokeless tobacco.     Goals     •  Reduce coffee intake  (pt-stated)       Barriers: none              RISK SCORE: 3        This document has been electronically signed by Rita Peterson MD on May 25, 2022 17:11 CDT

## 2022-07-12 ENCOUNTER — OFFICE VISIT (OUTPATIENT)
Dept: FAMILY MEDICINE CLINIC | Facility: CLINIC | Age: 22
End: 2022-07-12

## 2022-07-12 VITALS
DIASTOLIC BLOOD PRESSURE: 72 MMHG | TEMPERATURE: 97.6 F | WEIGHT: 166.9 LBS | HEART RATE: 89 BPM | BODY MASS INDEX: 30.71 KG/M2 | HEIGHT: 62 IN | SYSTOLIC BLOOD PRESSURE: 120 MMHG | OXYGEN SATURATION: 98 %

## 2022-07-12 DIAGNOSIS — G43.111 INTRACTABLE MIGRAINE WITH AURA WITH STATUS MIGRAINOSUS: Primary | ICD-10-CM

## 2022-07-12 DIAGNOSIS — Z13.0 SCREENING FOR DEFICIENCY ANEMIA: ICD-10-CM

## 2022-07-12 DIAGNOSIS — J30.2 SEASONAL ALLERGIC RHINITIS, UNSPECIFIED TRIGGER: ICD-10-CM

## 2022-07-12 DIAGNOSIS — E16.2 HYPOGLYCEMIA, UNSPECIFIED: ICD-10-CM

## 2022-07-12 PROCEDURE — 99213 OFFICE O/P EST LOW 20 MIN: CPT | Performed by: STUDENT IN AN ORGANIZED HEALTH CARE EDUCATION/TRAINING PROGRAM

## 2022-07-12 RX ORDER — FLUTICASONE PROPIONATE 50 MCG
2 SPRAY, SUSPENSION (ML) NASAL DAILY
Qty: 9.9 ML | Refills: 3 | Status: SHIPPED | OUTPATIENT
Start: 2022-07-12 | End: 2022-12-02 | Stop reason: SDUPTHER

## 2022-07-12 RX ORDER — GALCANEZUMAB 120 MG/ML
120 INJECTION, SOLUTION SUBCUTANEOUS AS NEEDED
COMMUNITY
Start: 2022-07-06 | End: 2023-02-17

## 2022-07-12 RX ORDER — RIMEGEPANT SULFATE 75 MG/75MG
75 TABLET, ORALLY DISINTEGRATING ORAL AS NEEDED
COMMUNITY
Start: 2022-07-07 | End: 2022-09-22 | Stop reason: SDUPTHER

## 2022-07-12 NOTE — PROGRESS NOTES
"  Family Medicine Residency  Rita Peterson MD    Subjective:     Rubi Hennessy is a 21 y.o. female who presents for getting help doing Emgality injection.  She states that after starting taking Emgality that her migraines have gone from every single day to about once a week.  She states that she is also been having a runny nose and has previously taken Flonase which is helped with her runny nose due to allergies.  Otherwise she states that she has been having episodes where she feels like she is shaky when she does not eat and her father believes that she has anemia because she does not have red meat.    The following portions of the patient's history were reviewed and updated as appropriate: allergies, current medications, past family history, past medical history, past social history, past surgical history and problem list.    Past Medical Hx:  Past Medical History:   Diagnosis Date   • Acute pharyngitis    • Acute sinusitis    • Allergic asthma     IgE-mediated allergic asthma   • Allergic rhinitis    • Allergic rhinitis due to pollen    • Attention deficit hyperactivity disorder    • Common cold    • Cough    • Diarrhea    • Foreign body of cornea, right     Foreign body - corneal right eye   • Headache     not ocular   • History of influenza    • History of otitis media    • Impacted cerumen, left ear    • Impetigo    • Myopia    • Nausea    • Nausea and vomiting    • Need for immunization against influenza     \"Needs influenza immunization\"   • Obstructive sleep apnea syndrome     Previous   • Pain in right wrist    • Pain in throat    • Rhinitis    • Rib pain    • Screening for mental retardation     Mental retardation screening - Extremely low level of intellectual functioning   • Upper respiratory infection    • Worried well        Past Surgical Hx:  Past Surgical History:   Procedure Laterality Date   • TONSILLECTOMY AND ADENOIDECTOMY  04/24/2006    T&A (1) - Obstructive sleep apnea syndrome. "       Current Meds:    Current Outpatient Medications:   •  aspirin-acetaminophen-caffeine (EXCEDRIN MIGRAINE) 250-250-65 MG per tablet, Take 1 tablet by mouth Every 6 (Six) Hours As Needed for Headache., Disp: , Rfl:   •  brompheniramine-pseudoephedrine-DM (Bromfed DM) 30-2-10 MG/5ML syrup, Take 5 mL by mouth Every 4 (Four) Hours As Needed for Congestion, Cough or Allergies., Disp: 120 mL, Rfl: 0  •  ibuprofen (ADVIL,MOTRIN) 200 MG tablet, Take 200 mg by mouth Every 6 (Six) Hours As Needed for Mild Pain ., Disp: , Rfl:   •  Emgality 120 MG/ML solution prefilled syringe, Inject 120 mg under the skin into the appropriate area as directed As Needed., Disp: , Rfl:   •  fluticasone (Flonase) 50 MCG/ACT nasal spray, 2 sprays into the nostril(s) as directed by provider Daily., Disp: 9.9 mL, Rfl: 3  •  Nurtec 75 MG dispersible tablet, Take 75 mg by mouth As Needed., Disp: , Rfl:     Allergies:  Allergies   Allergen Reactions   • Penicillins Rash       Family Hx:  Family History   Problem Relation Age of Onset   • No Known Problems Mother    • Diabetes Father    • Hypertension Father    • Asthma Sister    • No Known Problems Brother    • No Known Problems Maternal Grandmother    • No Known Problems Maternal Grandfather    • Diabetes Paternal Grandmother    • Hypertension Paternal Grandfather         Social History:  Social History     Socioeconomic History   • Marital status: Single   Tobacco Use   • Smoking status: Never Smoker   • Smokeless tobacco: Never Used   Substance and Sexual Activity   • Alcohol use: No   • Drug use: No   • Sexual activity: Never       Review of Systems  Review of Systems   Constitutional: Negative for fatigue.   Respiratory: Negative for shortness of breath.    Cardiovascular: Negative for chest pain and leg swelling.   Gastrointestinal: Negative for abdominal pain, constipation, diarrhea and nausea.   Endocrine: Negative for cold intolerance.   Genitourinary: Negative for menstrual problem.  "  Skin: Negative for rash.   Neurological: Negative for weakness, light-headedness and numbness.       Objective:     /72   Pulse 89   Temp 97.6 °F (36.4 °C)   Ht 157.5 cm (62\")   Wt 75.7 kg (166 lb 14.4 oz)   SpO2 98%   BMI 30.53 kg/m²   Physical Exam  Vitals reviewed.   Constitutional:       Appearance: Normal appearance.   HENT:      Head: Normocephalic and atraumatic.      Right Ear: Tympanic membrane normal.      Left Ear: Tympanic membrane normal.      Nose: Nose normal.      Mouth/Throat:      Mouth: Mucous membranes are moist.   Eyes:      Pupils: Pupils are equal, round, and reactive to light.   Cardiovascular:      Rate and Rhythm: Normal rate.      Pulses: Normal pulses.      Heart sounds: Normal heart sounds.   Pulmonary:      Effort: Pulmonary effort is normal.      Breath sounds: Normal breath sounds.   Abdominal:      General: Abdomen is flat. Bowel sounds are normal.      Palpations: Abdomen is soft.   Musculoskeletal:         General: Normal range of motion.      Cervical back: Normal range of motion and neck supple.   Skin:     General: Skin is warm and dry.      Capillary Refill: Capillary refill takes less than 2 seconds.   Neurological:      General: No focal deficit present.      Mental Status: She is alert and oriented to person, place, and time. Mental status is at baseline.   Psychiatric:         Mood and Affect: Mood normal.          Assessment/Plan:     Diagnoses and all orders for this visit:    1. Intractable migraine with aura with status migrainosus (Primary)  -Patient is currently on Emgality.  We have given her her injection Emgality in the office today.  Patient states that her Emgality has reduced her migraines from every single day to once a week.  We will continue patient on current management.     2. Screening for deficiency anemia  -     CBC w AUTO Differential    3. Hypoglycemia, unspecified   Patient reports that her sugars get low when she does not eat.  Will " check patient's hemoglobin A1c.  -     Hemoglobin A1c    4. Seasonal allergic rhinitis, unspecified trigger  -     fluticasone (Flonase) 50 MCG/ACT nasal spray; 2 sprays into the nostril(s) as directed by provider Daily.  Dispense: 9.9 mL; Refill: 3        · Rx changes: n/a  · Patient Education: n/a  · Compliance at present is estimated to be good.   · Efforts to improve compliance (if necessary) will be directed at increased exercise.    Depression screening: Up to date; last screen      Follow-up:     Return in about 8 weeks (around 9/6/2022).    Preventative:  Health Maintenance   Topic Date Due   • COVID-19 Vaccine (1) Never done   • HEPATITIS C SCREENING  Never done   • ANNUAL WELLNESS VISIT  Never done   • CHLAMYDIA SCREENING  Never done   • PAP SMEAR  Never done   • HPV VACCINES (3 - 3-dose series) 04/12/2019   • INFLUENZA VACCINE  10/01/2022   • TDAP/TD VACCINES (2 - Tdap) 03/19/2024   • MENINGOCOCCAL VACCINE  Completed   • Pneumococcal Vaccine 0-64  Aged Out       Alcohol use:  reports no history of alcohol use.  Nicotine status  reports that she has never smoked. She has never used smokeless tobacco.     Goals     •  Reduce coffee intake  (pt-stated)       Barriers: none              RISK SCORE: 3        This document has been electronically signed by Rita Peterson MD on July 12, 2022 16:59 CDT

## 2022-07-28 NOTE — PROGRESS NOTES
"Rubi Hennessy 7/12/22  Subjective:     Rubi Hennessy is a 21 y.o. female who presents for   Chief Complaint   Patient presents with   • Follow-up     1mo         21-year-old female with history of frequent migraines atopy fatigue here for 1 month follow-up she is accompanied by her father who reports that she has anemia because she does not eat red meat patient currently is getting her migraines about once a week since starting on Emgality.  Please see Dr. Peterson's note for more detailed information regarding today's encounter physical exam findings and plan of care.        Past Medical Hx:  Past Medical History:   Diagnosis Date   • Acute pharyngitis    • Acute sinusitis    • Allergic asthma     IgE-mediated allergic asthma   • Allergic rhinitis    • Allergic rhinitis due to pollen    • Attention deficit hyperactivity disorder    • Common cold    • Cough    • Diarrhea    • Foreign body of cornea, right     Foreign body - corneal right eye   • Headache     not ocular   • History of influenza    • History of otitis media    • Impacted cerumen, left ear    • Impetigo    • Myopia    • Nausea    • Nausea and vomiting    • Need for immunization against influenza     \"Needs influenza immunization\"   • Obstructive sleep apnea syndrome     Previous   • Pain in right wrist    • Pain in throat    • Rhinitis    • Rib pain    • Screening for mental retardation     Mental retardation screening - Extremely low level of intellectual functioning   • Upper respiratory infection    • Worried well        Past Surgical Hx:  Past Surgical History:   Procedure Laterality Date   • TONSILLECTOMY AND ADENOIDECTOMY  04/24/2006    T&A (1) - Obstructive sleep apnea syndrome.       Health Maintenance:  Health Maintenance   Topic Date Due   • COVID-19 Vaccine (1) Never done   • HEPATITIS C SCREENING  Never done   • ANNUAL WELLNESS VISIT  Never done   • CHLAMYDIA SCREENING  Never done   • PAP SMEAR  Never done   • HPV VACCINES (3 " "- 3-dose series) 04/12/2019   • INFLUENZA VACCINE  10/01/2022   • TDAP/TD VACCINES (2 - Tdap) 03/19/2024   • MENINGOCOCCAL VACCINE  Completed   • Pneumococcal Vaccine 0-64  Aged Out       Current Meds:    Current Outpatient Medications:   •  aspirin-acetaminophen-caffeine (EXCEDRIN MIGRAINE) 250-250-65 MG per tablet, Take 1 tablet by mouth Every 6 (Six) Hours As Needed for Headache., Disp: , Rfl:   •  brompheniramine-pseudoephedrine-DM (Bromfed DM) 30-2-10 MG/5ML syrup, Take 5 mL by mouth Every 4 (Four) Hours As Needed for Congestion, Cough or Allergies., Disp: 120 mL, Rfl: 0  •  ibuprofen (ADVIL,MOTRIN) 200 MG tablet, Take 200 mg by mouth Every 6 (Six) Hours As Needed for Mild Pain ., Disp: , Rfl:   •  Emgality 120 MG/ML solution prefilled syringe, Inject 120 mg under the skin into the appropriate area as directed As Needed., Disp: , Rfl:   •  fluticasone (Flonase) 50 MCG/ACT nasal spray, 2 sprays into the nostril(s) as directed by provider Daily., Disp: 9.9 mL, Rfl: 3  •  Nurtec 75 MG dispersible tablet, Take 75 mg by mouth As Needed., Disp: , Rfl:     Allergies:  Penicillins    Family Hx:  Family History   Problem Relation Age of Onset   • No Known Problems Mother    • Diabetes Father    • Hypertension Father    • Asthma Sister    • No Known Problems Brother    • No Known Problems Maternal Grandmother    • No Known Problems Maternal Grandfather    • Diabetes Paternal Grandmother    • Hypertension Paternal Grandfather         Social History:  Social History     Socioeconomic History   • Marital status: Single   Tobacco Use   • Smoking status: Never Smoker   • Smokeless tobacco: Never Used   Substance and Sexual Activity   • Alcohol use: No   • Drug use: No   • Sexual activity: Never       Review of Systems  Review of Systems    Objective:     /72   Pulse 89   Temp 97.6 °F (36.4 °C)   Ht 157.5 cm (62\")   Wt 75.7 kg (166 lb 14.4 oz)   SpO2 98%   BMI 30.53 kg/m²   Physical Exam please see Dr. Peterson's " note for more detailed information regarding physical exam findings.    Lab Review  Results for orders placed or performed in visit on 04/25/22   POCT pregnancy, urine    Specimen: Urine   Result Value Ref Range    HCG, Urine, QL Negative Negative    Lot Number [id number]     Internal Positive Control Positive Positive, Passed    Internal Negative Control Negative Negative, Passed    Expiration Date 03/31/2023           Assessment:     Diagnoses and all orders for this visit:    1. Intractable migraine with aura with status migrainosus (Primary)    2. Screening for deficiency anemia  -     CBC w AUTO Differential    3. Hypoglycemia, unspecified   -     Hemoglobin A1c    4. Seasonal allergic rhinitis, unspecified trigger    Other orders  -     fluticasone (Flonase) 50 MCG/ACT nasal spray; 2 sprays into the nostril(s) as directed by provider Daily.  Dispense: 9.9 mL; Refill: 3        Plan:     I have seen and examined the patient.  I have reviewed the notes, assessments, and/or procedures performed by Dr. Peterson during the office visit.  I concur with her/his documentation and assessment and plan for Rubi Hennessy.        This document has been electronically signed by Saroj Bhatti MD on July 28, 2022 15:25 CDT

## 2022-09-22 ENCOUNTER — OFFICE VISIT (OUTPATIENT)
Dept: FAMILY MEDICINE CLINIC | Facility: CLINIC | Age: 22
End: 2022-09-22

## 2022-09-22 VITALS
SYSTOLIC BLOOD PRESSURE: 110 MMHG | WEIGHT: 168.7 LBS | BODY MASS INDEX: 31.04 KG/M2 | HEIGHT: 62 IN | DIASTOLIC BLOOD PRESSURE: 60 MMHG

## 2022-09-22 DIAGNOSIS — G89.29 CHRONIC PAIN OF LEFT KNEE: Primary | ICD-10-CM

## 2022-09-22 DIAGNOSIS — G43.111 INTRACTABLE MIGRAINE WITH AURA WITH STATUS MIGRAINOSUS: ICD-10-CM

## 2022-09-22 DIAGNOSIS — M25.562 CHRONIC PAIN OF LEFT KNEE: Primary | ICD-10-CM

## 2022-09-22 PROCEDURE — 99214 OFFICE O/P EST MOD 30 MIN: CPT | Performed by: STUDENT IN AN ORGANIZED HEALTH CARE EDUCATION/TRAINING PROGRAM

## 2022-09-22 RX ORDER — RIMEGEPANT SULFATE 75 MG/75MG
75 TABLET, ORALLY DISINTEGRATING ORAL AS NEEDED
Qty: 30 TABLET | Refills: 1 | Status: SHIPPED | OUTPATIENT
Start: 2022-09-22 | End: 2022-12-02 | Stop reason: SDUPTHER

## 2022-09-22 RX ORDER — ATOGEPANT 30 MG/1
30 TABLET ORAL DAILY
Qty: 30 TABLET | Refills: 1 | Status: SHIPPED | OUTPATIENT
Start: 2022-09-22 | End: 2022-12-02 | Stop reason: SDUPTHER

## 2022-09-22 RX ORDER — GALCANEZUMAB 120 MG/ML
120 INJECTION, SOLUTION SUBCUTANEOUS AS NEEDED
Qty: 1.12 ML | Status: CANCELLED | OUTPATIENT
Start: 2022-09-22

## 2022-09-22 NOTE — PROGRESS NOTES
"  Family Medicine Residency  Rita Peterson MD    Subjective:     Rubi Hennessy is a 21 y.o. female who presents for management of migraines.  Patient states that she feels like her Emgality medication wears off about residential into the month and she consistently has daily headaches.  She states that she has to take her Nurtec and her Excedrin which do help relieve some of the headaches however she would like to know if there is any other medications that can help manage her headaches.  In addition patient states that she has been having some left knee pain for a long time she thinks over many years.  She feels as though she has a popping sensation in her knee.  She states that her pain is on and off but sometimes when she walks she feels like her knee pops.  As per the patient's father the patient has had imaging done many years ago and was told that there was no fractures at that time.    The following portions of the patient's history were reviewed and updated as appropriate: allergies, current medications, past family history, past medical history, past social history, past surgical history and problem list.    Past Medical Hx:  Past Medical History:   Diagnosis Date   • Acute pharyngitis    • Acute sinusitis    • Allergic asthma     IgE-mediated allergic asthma   • Allergic rhinitis    • Allergic rhinitis due to pollen    • Attention deficit hyperactivity disorder    • Common cold    • Cough    • Diarrhea    • Foreign body of cornea, right     Foreign body - corneal right eye   • Headache     not ocular   • History of influenza    • History of otitis media    • Impacted cerumen, left ear    • Impetigo    • Myopia    • Nausea    • Nausea and vomiting    • Need for immunization against influenza     \"Needs influenza immunization\"   • Obstructive sleep apnea syndrome     Previous   • Pain in right wrist    • Pain in throat    • Rhinitis    • Rib pain    • Screening for mental retardation     Mental " retardation screening - Extremely low level of intellectual functioning   • Upper respiratory infection    • Worried well        Past Surgical Hx:  Past Surgical History:   Procedure Laterality Date   • TONSILLECTOMY AND ADENOIDECTOMY  04/24/2006    T&A (1) - Obstructive sleep apnea syndrome.       Current Meds:    Current Outpatient Medications:   •  aspirin-acetaminophen-caffeine (EXCEDRIN MIGRAINE) 250-250-65 MG per tablet, Take 1 tablet by mouth Every 6 (Six) Hours As Needed for Headache., Disp: , Rfl:   •  brompheniramine-pseudoephedrine-DM (Bromfed DM) 30-2-10 MG/5ML syrup, Take one tsp BID prn cough and congestion, Disp: 120 mL, Rfl: 0  •  Emgality 120 MG/ML solution prefilled syringe, Inject 120 mg under the skin into the appropriate area as directed As Needed., Disp: , Rfl:   •  fluticasone (Flonase) 50 MCG/ACT nasal spray, 2 sprays into the nostril(s) as directed by provider Daily., Disp: 9.9 mL, Rfl: 3  •  Nurtec 75 MG dispersible tablet, Take 1 tablet by mouth As Needed (migraine)., Disp: 30 tablet, Rfl: 1  •  Atogepant (Qulipta) 30 MG tablet, Take 1 tablet by mouth Daily., Disp: 30 tablet, Rfl: 1    Allergies:  Allergies   Allergen Reactions   • Penicillins Rash       Family Hx:  Family History   Problem Relation Age of Onset   • No Known Problems Mother    • Diabetes Father    • Hypertension Father    • Asthma Sister    • No Known Problems Brother    • No Known Problems Maternal Grandmother    • No Known Problems Maternal Grandfather    • Diabetes Paternal Grandmother    • Hypertension Paternal Grandfather         Social History:  Social History     Socioeconomic History   • Marital status: Single   Tobacco Use   • Smoking status: Never Smoker   • Smokeless tobacco: Never Used   Substance and Sexual Activity   • Alcohol use: No   • Drug use: No   • Sexual activity: Never       Review of Systems  Review of Systems   Constitutional: Negative for fatigue.   Respiratory: Negative for shortness of breath.   "  Cardiovascular: Negative for chest pain and leg swelling.   Gastrointestinal: Negative for abdominal pain, constipation, diarrhea and nausea.   Endocrine: Negative for cold intolerance.   Genitourinary: Negative for menstrual problem.   Skin: Negative for rash.   Neurological: Positive for headaches. Negative for weakness, light-headedness and numbness.       Objective:     /60   Ht 157.5 cm (62\")   Wt 76.5 kg (168 lb 11.2 oz)   BMI 30.86 kg/m²   Physical Exam  Vitals reviewed.   Constitutional:       Appearance: Normal appearance.   HENT:      Head: Normocephalic and atraumatic.      Right Ear: Tympanic membrane normal.      Left Ear: Tympanic membrane normal.      Nose: Nose normal.      Mouth/Throat:      Mouth: Mucous membranes are moist.   Eyes:      Pupils: Pupils are equal, round, and reactive to light.   Cardiovascular:      Rate and Rhythm: Normal rate.      Pulses: Normal pulses.      Heart sounds: Normal heart sounds.   Pulmonary:      Effort: Pulmonary effort is normal.      Breath sounds: Normal breath sounds.   Abdominal:      General: Abdomen is flat. Bowel sounds are normal.      Palpations: Abdomen is soft.   Musculoskeletal:         General: Normal range of motion.      Cervical back: Normal range of motion and neck supple.   Skin:     General: Skin is warm and dry.      Capillary Refill: Capillary refill takes less than 2 seconds.   Neurological:      General: No focal deficit present.      Mental Status: She is alert and oriented to person, place, and time. Mental status is at baseline.   Psychiatric:         Mood and Affect: Mood normal.          Assessment/Plan:     Diagnoses and all orders for this visit:    1. Chronic pain of left knee (Primary)   -We will get imaging of her left knee.  Suspect that this may be a meniscal tear or ligamentous issue.  We will get MRI if the imaging of the left knee does not show any results.  I have advised the patient to use a knee brace.   -     XR " Knee 3 View Left  -     Ambulatory Referral to Orthopedic Surgery    2. Intractable migraine with aura with status migrainosus  -We have decided changes patient from Emgality to Qulipta.  We have discussed the risks and benefits of starting new medication.  We will follow-up in 1 month to see if the medication has had improved her migraines.  -     Nurtec 75 MG dispersible tablet; Take 1 tablet by mouth As Needed (migraine).  Dispense: 30 tablet; Refill: 1  -     Atogepant (Qulipta) 30 MG tablet; Take 1 tablet by mouth Daily.  Dispense: 30 tablet; Refill: 1        · Rx changes: as above   · Patient Education: n/a   · Compliance at present is estimated to be good.   · Efforts to improve compliance (if necessary) will be directed at increased exercise.    Depression screening: Up to date; last screen      Follow-up:     Return in about 4 weeks (around 10/20/2022).    Preventative:  Health Maintenance   Topic Date Due   • COVID-19 Vaccine (1) Never done   • HEPATITIS C SCREENING  Never done   • ANNUAL WELLNESS VISIT  Never done   • CHLAMYDIA SCREENING  Never done   • PAP SMEAR  Never done   • HPV VACCINES (3 - 3-dose series) 04/12/2019   • INFLUENZA VACCINE  10/01/2022   • TDAP/TD VACCINES (2 - Tdap) 03/19/2024   • MENINGOCOCCAL VACCINE  Completed   • Pneumococcal Vaccine 0-64  Aged Out         Alcohol use:  reports no history of alcohol use.  Nicotine status  reports that she has never smoked. She has never used smokeless tobacco.     Goals     •  Reduce coffee intake  (pt-stated)       Barriers: none            RISK SCORE: 3        This document has been electronically signed by Rita Peterson MD on September 22, 2022 12:10 CDT

## 2022-09-28 ENCOUNTER — TELEPHONE (OUTPATIENT)
Dept: FAMILY MEDICINE CLINIC | Facility: CLINIC | Age: 22
End: 2022-09-28

## 2022-09-29 ENCOUNTER — TELEPHONE (OUTPATIENT)
Dept: FAMILY MEDICINE CLINIC | Facility: CLINIC | Age: 22
End: 2022-09-29

## 2022-12-02 ENCOUNTER — OFFICE VISIT (OUTPATIENT)
Dept: FAMILY MEDICINE CLINIC | Facility: CLINIC | Age: 22
End: 2022-12-02

## 2022-12-02 VITALS
HEART RATE: 106 BPM | WEIGHT: 180.2 LBS | SYSTOLIC BLOOD PRESSURE: 120 MMHG | OXYGEN SATURATION: 98 % | HEIGHT: 62 IN | BODY MASS INDEX: 33.16 KG/M2 | DIASTOLIC BLOOD PRESSURE: 72 MMHG

## 2022-12-02 DIAGNOSIS — G43.111 INTRACTABLE MIGRAINE WITH AURA WITH STATUS MIGRAINOSUS: ICD-10-CM

## 2022-12-02 PROCEDURE — 99213 OFFICE O/P EST LOW 20 MIN: CPT | Performed by: STUDENT IN AN ORGANIZED HEALTH CARE EDUCATION/TRAINING PROGRAM

## 2022-12-02 RX ORDER — GALCANEZUMAB 120 MG/ML
120 INJECTION, SOLUTION SUBCUTANEOUS AS NEEDED
Qty: 1.12 ML | Status: CANCELLED | OUTPATIENT
Start: 2022-12-02

## 2022-12-02 RX ORDER — RIMEGEPANT SULFATE 75 MG/75MG
75 TABLET, ORALLY DISINTEGRATING ORAL AS NEEDED
Qty: 16 TABLET | Refills: 1 | Status: SHIPPED | OUTPATIENT
Start: 2022-12-02 | End: 2023-02-08 | Stop reason: SDUPTHER

## 2022-12-02 RX ORDER — RIMEGEPANT SULFATE 75 MG/75MG
75 TABLET, ORALLY DISINTEGRATING ORAL AS NEEDED
Qty: 16 TABLET | Refills: 1 | Status: SHIPPED | OUTPATIENT
Start: 2022-12-02 | End: 2022-12-02

## 2022-12-02 RX ORDER — FLUTICASONE PROPIONATE 50 MCG
2 SPRAY, SUSPENSION (ML) NASAL DAILY
Qty: 9.9 ML | Refills: 3 | Status: SHIPPED | OUTPATIENT
Start: 2022-12-02 | End: 2023-02-08 | Stop reason: SDUPTHER

## 2022-12-02 RX ORDER — ATOGEPANT 30 MG/1
30 TABLET ORAL DAILY
Qty: 30 TABLET | Refills: 1 | Status: SHIPPED | OUTPATIENT
Start: 2022-12-02

## 2022-12-02 NOTE — PROGRESS NOTES
"  Family Medicine Residency  Rita Peterson MD    Subjective:     Rubi Hennessy is a 21 y.o. female who presents for management of migraines.  Patient notes that she has been having migraines that starts in the month when she feels like the Emgality is starting to wear off.  She experiences migraines for about 1.5 weeks after taking Emgality.  Patient states that she would not like to start Botox at this time as she has severe needle phobia.    The following portions of the patient's history were reviewed and updated as appropriate: allergies, current medications, past family history, past medical history, past social history, past surgical history and problem list.    Past Medical Hx:  Past Medical History:   Diagnosis Date   • Acute pharyngitis    • Acute sinusitis    • Allergic asthma     IgE-mediated allergic asthma   • Allergic rhinitis    • Allergic rhinitis due to pollen    • Attention deficit hyperactivity disorder    • Common cold    • Cough    • Diarrhea    • Foreign body of cornea, right     Foreign body - corneal right eye   • Headache     not ocular   • History of influenza    • History of otitis media    • Impacted cerumen, left ear    • Impetigo    • Myopia    • Nausea    • Nausea and vomiting    • Need for immunization against influenza     \"Needs influenza immunization\"   • Obstructive sleep apnea syndrome     Previous   • Pain in right wrist    • Pain in throat    • Rhinitis    • Rib pain    • Screening for mental retardation     Mental retardation screening - Extremely low level of intellectual functioning   • Upper respiratory infection    • Worried well        Past Surgical Hx:  Past Surgical History:   Procedure Laterality Date   • TONSILLECTOMY AND ADENOIDECTOMY  04/24/2006    T&A (1) - Obstructive sleep apnea syndrome.       Current Meds:    Current Outpatient Medications:   •  aspirin-acetaminophen-caffeine (EXCEDRIN MIGRAINE) 250-250-65 MG per tablet, Take 1 tablet by mouth Every 6 " "(Six) Hours As Needed for Headache., Disp: , Rfl:   •  Atogepant (Qulipta) 30 MG tablet, Take 1 tablet by mouth Daily., Disp: 30 tablet, Rfl: 1  •  Emgality 120 MG/ML solution prefilled syringe, Inject 120 mg under the skin into the appropriate area as directed As Needed., Disp: , Rfl:   •  fluticasone (Flonase) 50 MCG/ACT nasal spray, 2 sprays into the nostril(s) as directed by provider Daily., Disp: 9.9 mL, Rfl: 3  •  Nurtec 75 MG dispersible tablet, Take 1 tablet by mouth As Needed (migraine)., Disp: 16 tablet, Rfl: 1    Allergies:  Allergies   Allergen Reactions   • Penicillins Rash       Family Hx:  Family History   Problem Relation Age of Onset   • No Known Problems Mother    • Diabetes Father    • Hypertension Father    • Asthma Sister    • No Known Problems Brother    • No Known Problems Maternal Grandmother    • No Known Problems Maternal Grandfather    • Diabetes Paternal Grandmother    • Hypertension Paternal Grandfather         Social History:  Social History     Socioeconomic History   • Marital status: Single   Tobacco Use   • Smoking status: Never   • Smokeless tobacco: Never   Substance and Sexual Activity   • Alcohol use: No   • Drug use: No   • Sexual activity: Never       Review of Systems  Review of Systems   Constitutional: Negative for fatigue.   Respiratory: Negative for shortness of breath.    Cardiovascular: Negative for chest pain and leg swelling.   Gastrointestinal: Negative for abdominal pain, constipation, diarrhea and nausea.   Endocrine: Negative for cold intolerance.   Genitourinary: Negative for menstrual problem.   Skin: Negative for rash.   Neurological: Negative for weakness, light-headedness and numbness.       Objective:     /72   Pulse 106   Ht 157.5 cm (62\")   Wt 81.7 kg (180 lb 3.2 oz)   SpO2 98%   BMI 32.96 kg/m²   Physical Exam  Vitals reviewed.   Constitutional:       Appearance: Normal appearance.   HENT:      Head: Normocephalic and atraumatic.      Right " Ear: Tympanic membrane normal.      Left Ear: Tympanic membrane normal.      Nose: Nose normal.      Mouth/Throat:      Mouth: Mucous membranes are moist.   Eyes:      Pupils: Pupils are equal, round, and reactive to light.   Cardiovascular:      Rate and Rhythm: Normal rate.      Pulses: Normal pulses.      Heart sounds: Normal heart sounds.   Pulmonary:      Effort: Pulmonary effort is normal.      Breath sounds: Normal breath sounds.   Abdominal:      General: Abdomen is flat. Bowel sounds are normal.      Palpations: Abdomen is soft.   Musculoskeletal:         General: Normal range of motion.      Cervical back: Normal range of motion and neck supple.   Skin:     General: Skin is warm and dry.      Capillary Refill: Capillary refill takes less than 2 seconds.   Neurological:      General: No focal deficit present.      Mental Status: She is alert and oriented to person, place, and time. Mental status is at baseline.   Psychiatric:         Mood and Affect: Mood normal.          Assessment/Plan:     Diagnoses and all orders for this visit:    1. Intractable migraine with aura with status migrainosus  -     Nurtec 75 MG dispersible tablet; Take 1 tablet by mouth As Needed (migraine).  Dispense: 16 tablet; Refill: 1  -Patient is still having migraines while using Emgality.  We will change to Quilpta at this time as the patient has severe needle phobia and would not like to start Botox.  In addition we will change Nurtec to preventative therapy and have the patient take it every other day.          · Rx changes: as above  · Patient Education: n/a  · Compliance at present is estimated to be good.   · Efforts to improve compliance (if necessary) will be directed at increased exercise.    Depression screening: Up to date; last screen      Follow-up:     Return in about 2 months (around 2/2/2023).    Preventative:  Health Maintenance   Topic Date Due   • COVID-19 Vaccine (1) Never done   • HEPATITIS C SCREENING  Never  done   • ANNUAL WELLNESS VISIT  Never done   • CHLAMYDIA SCREENING  Never done   • PAP SMEAR  Never done   • HPV VACCINES (3 - 3-dose series) 04/12/2019   • INFLUENZA VACCINE  08/01/2022   • TDAP/TD VACCINES (2 - Tdap) 03/19/2024   • MENINGOCOCCAL VACCINE  Completed   • Pneumococcal Vaccine 0-64  Aged Out         Alcohol use:  reports no history of alcohol use.  Nicotine status  reports that she has never smoked. She has never used smokeless tobacco.     Goals     •  Reduce coffee intake  (pt-stated)       Barriers: none            RISK SCORE: 3        This document has been electronically signed by Rita Peterson MD on December 2, 2022 17:17 CST

## 2022-12-13 NOTE — PROGRESS NOTES
I have reviewed the notes, assessments, and/or procedures performed by Rita Peterson MD during office visit. I concur with her/his documentation and assessment and plan for Rubi Hennessy.          This document has been electronically signed by Giselle Rosado MD on December 13, 2022 12:34 CST

## 2023-02-08 ENCOUNTER — LAB (OUTPATIENT)
Dept: LAB | Facility: HOSPITAL | Age: 23
End: 2023-02-08
Payer: MEDICARE

## 2023-02-08 ENCOUNTER — OFFICE VISIT (OUTPATIENT)
Dept: FAMILY MEDICINE CLINIC | Facility: CLINIC | Age: 23
End: 2023-02-08
Payer: MEDICARE

## 2023-02-08 VITALS
DIASTOLIC BLOOD PRESSURE: 74 MMHG | SYSTOLIC BLOOD PRESSURE: 124 MMHG | OXYGEN SATURATION: 98 % | BODY MASS INDEX: 34.78 KG/M2 | WEIGHT: 189 LBS | HEIGHT: 62 IN | HEART RATE: 102 BPM

## 2023-02-08 DIAGNOSIS — Z30.9 ENCOUNTER FOR CONTRACEPTIVE MANAGEMENT, UNSPECIFIED TYPE: ICD-10-CM

## 2023-02-08 DIAGNOSIS — Z32.00 POSSIBLE PREGNANCY, NOT CONFIRMED: Primary | ICD-10-CM

## 2023-02-08 DIAGNOSIS — G43.111 INTRACTABLE MIGRAINE WITH AURA WITH STATUS MIGRAINOSUS: ICD-10-CM

## 2023-02-08 DIAGNOSIS — J30.2 SEASONAL ALLERGIC RHINITIS, UNSPECIFIED TRIGGER: ICD-10-CM

## 2023-02-08 LAB
B-HCG UR QL: NEGATIVE
BASOPHILS # BLD AUTO: 0.06 10*3/MM3 (ref 0–0.2)
BASOPHILS NFR BLD AUTO: 0.7 % (ref 0–1.5)
DEPRECATED RDW RBC AUTO: 36.1 FL (ref 37–54)
EOSINOPHIL # BLD AUTO: 0.1 10*3/MM3 (ref 0–0.4)
EOSINOPHIL NFR BLD AUTO: 1.2 % (ref 0.3–6.2)
ERYTHROCYTE [DISTWIDTH] IN BLOOD BY AUTOMATED COUNT: 12 % (ref 12.3–15.4)
EXPIRATION DATE: NORMAL
HBA1C MFR BLD: 5 % (ref 4.8–5.6)
HCT VFR BLD AUTO: 40.3 % (ref 34–46.6)
HGB BLD-MCNC: 14 G/DL (ref 12–15.9)
IMM GRANULOCYTES # BLD AUTO: 0.03 10*3/MM3 (ref 0–0.05)
IMM GRANULOCYTES NFR BLD AUTO: 0.3 % (ref 0–0.5)
INTERNAL NEGATIVE CONTROL: NEGATIVE
INTERNAL POSITIVE CONTROL: POSITIVE
LYMPHOCYTES # BLD AUTO: 3.26 10*3/MM3 (ref 0.7–3.1)
LYMPHOCYTES NFR BLD AUTO: 37.9 % (ref 19.6–45.3)
Lab: NORMAL
MCH RBC QN AUTO: 28.8 PG (ref 26.6–33)
MCHC RBC AUTO-ENTMCNC: 34.7 G/DL (ref 31.5–35.7)
MCV RBC AUTO: 82.9 FL (ref 79–97)
MONOCYTES # BLD AUTO: 0.49 10*3/MM3 (ref 0.1–0.9)
MONOCYTES NFR BLD AUTO: 5.7 % (ref 5–12)
NEUTROPHILS NFR BLD AUTO: 4.66 10*3/MM3 (ref 1.7–7)
NEUTROPHILS NFR BLD AUTO: 54.2 % (ref 42.7–76)
NRBC BLD AUTO-RTO: 0 /100 WBC (ref 0–0.2)
PLATELET # BLD AUTO: 261 10*3/MM3 (ref 140–450)
PMV BLD AUTO: 10.9 FL (ref 6–12)
RBC # BLD AUTO: 4.86 10*6/MM3 (ref 3.77–5.28)
WBC NRBC COR # BLD: 8.6 10*3/MM3 (ref 3.4–10.8)

## 2023-02-08 PROCEDURE — 36415 COLL VENOUS BLD VENIPUNCTURE: CPT | Performed by: STUDENT IN AN ORGANIZED HEALTH CARE EDUCATION/TRAINING PROGRAM

## 2023-02-08 PROCEDURE — 99213 OFFICE O/P EST LOW 20 MIN: CPT | Performed by: STUDENT IN AN ORGANIZED HEALTH CARE EDUCATION/TRAINING PROGRAM

## 2023-02-08 PROCEDURE — 83036 HEMOGLOBIN GLYCOSYLATED A1C: CPT | Performed by: STUDENT IN AN ORGANIZED HEALTH CARE EDUCATION/TRAINING PROGRAM

## 2023-02-08 PROCEDURE — 85025 COMPLETE CBC W/AUTO DIFF WBC: CPT | Performed by: STUDENT IN AN ORGANIZED HEALTH CARE EDUCATION/TRAINING PROGRAM

## 2023-02-08 PROCEDURE — 81025 URINE PREGNANCY TEST: CPT | Performed by: STUDENT IN AN ORGANIZED HEALTH CARE EDUCATION/TRAINING PROGRAM

## 2023-02-08 RX ORDER — FLUTICASONE PROPIONATE 50 MCG
2 SPRAY, SUSPENSION (ML) NASAL DAILY
Qty: 9.9 ML | Refills: 3 | Status: SHIPPED | OUTPATIENT
Start: 2023-02-08

## 2023-02-08 RX ORDER — RIMEGEPANT SULFATE 75 MG/75MG
75 TABLET, ORALLY DISINTEGRATING ORAL AS NEEDED
Qty: 16 TABLET | Refills: 1 | Status: SHIPPED | OUTPATIENT
Start: 2023-02-08 | End: 2023-04-07 | Stop reason: SDUPTHER

## 2023-02-08 RX ORDER — GALCANEZUMAB 120 MG/ML
120 INJECTION, SOLUTION SUBCUTANEOUS AS NEEDED
Qty: 1.12 ML | Status: CANCELLED | OUTPATIENT
Start: 2023-02-08

## 2023-02-09 ENCOUNTER — CLINICAL SUPPORT (OUTPATIENT)
Dept: FAMILY MEDICINE CLINIC | Facility: CLINIC | Age: 23
End: 2023-02-09
Payer: MEDICARE

## 2023-02-09 ENCOUNTER — TELEPHONE (OUTPATIENT)
Dept: FAMILY MEDICINE CLINIC | Facility: CLINIC | Age: 23
End: 2023-02-09
Payer: MEDICARE

## 2023-02-09 DIAGNOSIS — Z30.42 DEPO-PROVERA CONTRACEPTIVE STATUS: Primary | ICD-10-CM

## 2023-02-09 RX ADMIN — MEDROXYPROGESTERONE ACETATE 150 MG: 150 INJECTION, SUSPENSION INTRAMUSCULAR at 04:00

## 2023-02-09 NOTE — TELEPHONE ENCOUNTER
Patient's significant other Costa Suresh, has called for lab results for the patient. Please call him at [phone] or the patient at [phone].    Rosa oMrales

## 2023-02-09 NOTE — PROGRESS NOTES
"Today  Family Medicine Residency  Rita Peterson MD    Subjective:     Rubi Hennessy is a 22 y.o. female who presents for follow-up treatment for migraines.  Patient states has not been able to  her goal of the medication.  She states that she still continues to have some migraines at this time.  Patient states that she had a positive pregnancy test and a negative pregnancy test at home.  She would like to get 1 in the office done today.  She would also like to be started on the Depo shot today.  Otherwise patient has no other acute complaints at this time.    The following portions of the patient's history were reviewed and updated as appropriate: allergies, current medications, past family history, past medical history, past social history, past surgical history and problem list.    Past Medical Hx:  Past Medical History:   Diagnosis Date   • Acute pharyngitis    • Acute sinusitis    • Allergic asthma     IgE-mediated allergic asthma   • Allergic rhinitis    • Allergic rhinitis due to pollen    • Attention deficit hyperactivity disorder    • Common cold    • Cough    • Diarrhea    • Foreign body of cornea, right     Foreign body - corneal right eye   • Headache     not ocular   • History of influenza    • History of otitis media    • Impacted cerumen, left ear    • Impetigo    • Myopia    • Nausea    • Nausea and vomiting    • Need for immunization against influenza     \"Needs influenza immunization\"   • Obstructive sleep apnea syndrome     Previous   • Pain in right wrist    • Pain in throat    • Rhinitis    • Rib pain    • Screening for mental retardation     Mental retardation screening - Extremely low level of intellectual functioning   • Upper respiratory infection    • Worried well        Past Surgical Hx:  Past Surgical History:   Procedure Laterality Date   • TONSILLECTOMY AND ADENOIDECTOMY  04/24/2006    T&A (1) - Obstructive sleep apnea syndrome.       Current Meds:    Current Outpatient " "Medications:   •  aspirin-acetaminophen-caffeine (EXCEDRIN MIGRAINE) 250-250-65 MG per tablet, Take 1 tablet by mouth Every 6 (Six) Hours As Needed for Headache., Disp: , Rfl:   •  Atogepant (Qulipta) 30 MG tablet, Take 1 tablet by mouth Daily., Disp: 30 tablet, Rfl: 1  •  Emgality 120 MG/ML solution prefilled syringe, Inject 120 mg under the skin into the appropriate area as directed As Needed., Disp: , Rfl:   •  fluticasone (Flonase) 50 MCG/ACT nasal spray, 2 sprays into the nostril(s) as directed by provider Daily., Disp: 9.9 mL, Rfl: 3  •  Nurtec 75 MG dispersible tablet, Take 1 tablet by mouth As Needed (migraine). Please take 1 tablet every other day., Disp: 16 tablet, Rfl: 1    Allergies:  Allergies   Allergen Reactions   • Penicillins Rash       Family Hx:  Family History   Problem Relation Age of Onset   • No Known Problems Mother    • Diabetes Father    • Hypertension Father    • Asthma Sister    • No Known Problems Brother    • No Known Problems Maternal Grandmother    • No Known Problems Maternal Grandfather    • Diabetes Paternal Grandmother    • Hypertension Paternal Grandfather         Social History:  Social History     Socioeconomic History   • Marital status: Single   Tobacco Use   • Smoking status: Never   • Smokeless tobacco: Never   Substance and Sexual Activity   • Alcohol use: No   • Drug use: No   • Sexual activity: Never       Review of Systems  Review of Systems   Constitutional: Negative for fatigue.   Respiratory: Negative for shortness of breath.    Cardiovascular: Negative for chest pain and leg swelling.   Gastrointestinal: Negative for abdominal pain, constipation, diarrhea and nausea.   Endocrine: Negative for cold intolerance.   Genitourinary: Negative for menstrual problem.   Skin: Negative for rash.   Neurological: Negative for weakness, light-headedness and numbness.       Objective:     /74   Pulse 102   Ht 157.5 cm (62\")   Wt 85.7 kg (189 lb)   SpO2 98%   BMI 34.57 " kg/m²   Physical Exam  Vitals reviewed.   Constitutional:       Appearance: Normal appearance.   HENT:      Head: Normocephalic and atraumatic.      Right Ear: Tympanic membrane normal.      Left Ear: Tympanic membrane normal.      Nose: Nose normal.      Mouth/Throat:      Mouth: Mucous membranes are moist.   Eyes:      Pupils: Pupils are equal, round, and reactive to light.   Cardiovascular:      Rate and Rhythm: Normal rate.      Pulses: Normal pulses.      Heart sounds: Normal heart sounds.   Pulmonary:      Effort: Pulmonary effort is normal.      Breath sounds: Normal breath sounds.   Abdominal:      General: Abdomen is flat. Bowel sounds are normal.      Palpations: Abdomen is soft.   Musculoskeletal:         General: Normal range of motion.      Cervical back: Normal range of motion and neck supple.   Skin:     General: Skin is warm and dry.      Capillary Refill: Capillary refill takes less than 2 seconds.   Neurological:      General: No focal deficit present.      Mental Status: She is alert and oriented to person, place, and time. Mental status is at baseline.   Psychiatric:         Mood and Affect: Mood normal.          Assessment/Plan:     Diagnoses and all orders for this visit:    1. Possible pregnancy, not confirmed (Primary)  - Patient states that she had one positive pregnancy test and one negative pregnancy test at home. Her in office pregnancy test was negative.   -     POCT pregnancy, urine    2. Encounter for contraceptive management, unspecified type  - We we reviewed different birth control options with the patient and she preferred to take a Depo shot.  We reviewed the risk benefits of starting the Depo shot.  In office pregnancy test was negative at this time.  We advised patient that she has to return in 3 months to get another repeat Depo shot.  We advised the patient that a Depo shot does not prevent STDs.      2. Intractable migraine with aura with status migrainosus  -Patient  continues to have migraines.  As per patient she has not been able to  her Qulipta.  Spoke to the clinical pharmacist who spoke to the pharmacy and they will run her Qulipta medication.   - I advised the patient that she should be able get her Qulipta. We also advised that she should get a head CT to rule out other causes of chronic headaches.  We will continue with Nurtec for preventative treatment at this time.  -     Nurtec 75 MG dispersible tablet; Take 1 tablet by mouth As Needed (migraine). Please take 1 tablet every other day.  Dispense: 16 tablet; Refill: 1  -     CT Head Without Contrast    3. Seasonal allergic rhinitis, unspecified trigger  - Condition is stable at this time. Will continue current management.   -     fluticasone (Flonase) 50 MCG/ACT nasal spray; 2 sprays into the nostril(s) as directed by provider Daily.  Dispense: 9.9 mL; Refill: 3        · Rx changes: as above  · Patient Education: n/a   · Compliance at present is estimated to be good.   · Efforts to improve compliance (if necessary) will be directed at increased exercise.    Depression screening: Up to date; last screen      Follow-up:     Return in about 4 weeks (around 3/8/2023).    Preventative:  Health Maintenance   Topic Date Due   • COVID-19 Vaccine (1) Never done   • HEPATITIS C SCREENING  Never done   • ANNUAL WELLNESS VISIT  Never done   • CHLAMYDIA SCREENING  Never done   • PAP SMEAR  Never done   • HPV VACCINES (3 - 3-dose series) 04/12/2019   • INFLUENZA VACCINE  08/01/2022   • TDAP/TD VACCINES (2 - Tdap) 03/19/2024   • MENINGOCOCCAL VACCINE  Completed   • Pneumococcal Vaccine 0-64  Aged Out       Alcohol use:  reports no history of alcohol use.  Nicotine status  reports that she has never smoked. She has never used smokeless tobacco.     Goals     •  Reduce coffee intake  (pt-stated)       Barriers: none            RISK SCORE: 3        This document has been electronically signed by Rita Peterson MD on February 9,  2023 09:31 CST

## 2023-02-10 RX ORDER — MEDROXYPROGESTERONE ACETATE 150 MG/ML
150 INJECTION, SUSPENSION INTRAMUSCULAR
Status: SHIPPED | OUTPATIENT
Start: 2023-02-09

## 2023-02-17 DIAGNOSIS — R51.9 CHRONIC NONINTRACTABLE HEADACHE, UNSPECIFIED HEADACHE TYPE: Primary | ICD-10-CM

## 2023-02-17 DIAGNOSIS — G89.29 CHRONIC NONINTRACTABLE HEADACHE, UNSPECIFIED HEADACHE TYPE: Primary | ICD-10-CM

## 2023-02-24 NOTE — TELEPHONE ENCOUNTER
The labs results have been reviewed. The patient has been called and informed of the lab results. All questions and concerns have been answered during this phone call.

## 2023-03-10 ENCOUNTER — HOSPITAL ENCOUNTER (OUTPATIENT)
Dept: MRI IMAGING | Facility: HOSPITAL | Age: 23
Discharge: HOME | End: 2023-03-10
Admitting: RADIOLOGY
Payer: MEDICARE

## 2023-03-10 PROCEDURE — 70551 MRI BRAIN STEM W/O DYE: CPT

## 2023-03-13 ENCOUNTER — TELEPHONE (OUTPATIENT)
Dept: FAMILY MEDICINE CLINIC | Facility: CLINIC | Age: 23
End: 2023-03-13
Payer: MEDICARE

## 2023-03-13 NOTE — TELEPHONE ENCOUNTER
Pt called saying she received a call (she is at work) and believes it's in regards to her MRI results.     Call back# [phone]

## 2023-04-07 DIAGNOSIS — G43.111 INTRACTABLE MIGRAINE WITH AURA WITH STATUS MIGRAINOSUS: ICD-10-CM

## 2023-04-07 RX ORDER — RIMEGEPANT SULFATE 75 MG/75MG
75 TABLET, ORALLY DISINTEGRATING ORAL AS NEEDED
Qty: 16 TABLET | Refills: 1 | Status: SHIPPED | OUTPATIENT
Start: 2023-04-07

## 2023-04-14 ENCOUNTER — LAB (OUTPATIENT)
Dept: LAB | Facility: HOSPITAL | Age: 23
End: 2023-04-14
Payer: MEDICARE

## 2023-04-14 ENCOUNTER — OFFICE VISIT (OUTPATIENT)
Dept: FAMILY MEDICINE CLINIC | Facility: CLINIC | Age: 23
End: 2023-04-14
Payer: MEDICARE

## 2023-04-14 DIAGNOSIS — E16.1 OTHER HYPOGLYCEMIA: ICD-10-CM

## 2023-04-14 DIAGNOSIS — R42 POSITIONAL LIGHTHEADEDNESS: Primary | ICD-10-CM

## 2023-04-14 DIAGNOSIS — G43.509 PERSISTENT MIGRAINE AURA WITHOUT CEREBRAL INFARCTION AND WITHOUT STATUS MIGRAINOSUS, NOT INTRACTABLE: ICD-10-CM

## 2023-04-14 LAB — HBA1C MFR BLD: 5 % (ref 4.8–5.6)

## 2023-04-14 PROCEDURE — 83036 HEMOGLOBIN GLYCOSYLATED A1C: CPT | Performed by: STUDENT IN AN ORGANIZED HEALTH CARE EDUCATION/TRAINING PROGRAM

## 2023-04-14 PROCEDURE — 36415 COLL VENOUS BLD VENIPUNCTURE: CPT | Performed by: STUDENT IN AN ORGANIZED HEALTH CARE EDUCATION/TRAINING PROGRAM

## 2023-04-14 NOTE — PROGRESS NOTES
"  Family Medicine Residency  Gautam Rivera MD    Subjective:     Rubi Hennessy is a 22 y.o. female who presents for migraines and lightheadedness.    Positional lightheadedness -patient reports that she has been lightheaded when going from a sitting to standing position at work.  Was seen in urgent care who said patient needs to get her blood sugars checked because they were low.  Patient's father put his CGM on her and has been noting that her blood sugars have been staying in the 70s and says that patient will eat and her blood sugars will go up to the 90s but then drop back down to the 70s.  Patient reports lightheadedness when her blood sugars in the 70s.  Urgent care had referred patient to endocrinology.    Migraines -patient previously had migraines daily.  However since starting Qulipta and Nurtec migraines have resolved when patient takes her medicine.  However patient misses 2-3 doses of Qulipta per week and gets a migraine on the days that she misses her doses.  Reports that if she takes the Nurtec it does help with the severity of the migraines.  Reports migraines with aura of sparkling lights, nausea, vomiting with photophobia and phonophobia.  Was previously on Emgality without benefit.      Past Medical Hx:  Past Medical History:   Diagnosis Date   • Acute pharyngitis    • Acute sinusitis    • Allergic asthma     IgE-mediated allergic asthma   • Allergic rhinitis    • Allergic rhinitis due to pollen    • Attention deficit hyperactivity disorder    • Common cold    • Cough    • Diarrhea    • Foreign body of cornea, right     Foreign body - corneal right eye   • Headache     not ocular   • History of influenza    • History of otitis media    • Impacted cerumen, left ear    • Impetigo    • Myopia    • Nausea    • Nausea and vomiting    • Need for immunization against influenza     \"Needs influenza immunization\"   • Obstructive sleep apnea syndrome     Previous   • Pain in right wrist    • " Pain in throat    • Rhinitis    • Rib pain    • Screening for mental retardation     Mental retardation screening - Extremely low level of intellectual functioning   • Upper respiratory infection    • Worried well        Past Surgical Hx:  Past Surgical History:   Procedure Laterality Date   • TONSILLECTOMY AND ADENOIDECTOMY  04/24/2006    T&A (1) - Obstructive sleep apnea syndrome.       Current Meds:    Current Outpatient Medications:   •  aspirin-acetaminophen-caffeine (EXCEDRIN MIGRAINE) 250-250-65 MG per tablet, Take 1 tablet by mouth Every 6 (Six) Hours As Needed for Headache., Disp: , Rfl:   •  Atogepant (Qulipta) 30 MG tablet, Take 1 tablet by mouth Daily., Disp: 30 tablet, Rfl: 1  •  fluticasone (Flonase) 50 MCG/ACT nasal spray, 2 sprays into the nostril(s) as directed by provider Daily., Disp: 9.9 mL, Rfl: 3  •  Nurtec 75 MG dispersible tablet, Take 1 tablet by mouth As Needed (migraine). Please take 1 tablet every other day., Disp: 16 tablet, Rfl: 1    Current Facility-Administered Medications:   •  medroxyPROGESTERone Acetate suspension prefilled syringe 150 mg, 150 mg, Intramuscular, Q3 Months, Rita Peterson MD, 150 mg at 02/09/23 0400    Allergies:  Allergies   Allergen Reactions   • Penicillins Rash       Family Hx:  Family History   Problem Relation Age of Onset   • No Known Problems Mother    • Diabetes Father    • Hypertension Father    • Asthma Sister    • No Known Problems Brother    • No Known Problems Maternal Grandmother    • No Known Problems Maternal Grandfather    • Diabetes Paternal Grandmother    • Hypertension Paternal Grandfather         Social History:  Social History     Socioeconomic History   • Marital status: Single   Tobacco Use   • Smoking status: Never   • Smokeless tobacco: Never   Vaping Use   • Vaping Use: Never used   Substance and Sexual Activity   • Alcohol use: No   • Drug use: No   • Sexual activity: Never       Review of Systems  Review of Systems   Neurological:  Positive for dizziness, light-headedness and headaches.       Objective:     There were no vitals taken for this visit.  Physical Exam  Constitutional:       General: She is not in acute distress.  HENT:      Head:      Comments: Sinuses nontender to palpation  Cardiovascular:      Rate and Rhythm: Normal rate and regular rhythm.   Pulmonary:      Effort: Pulmonary effort is normal. No respiratory distress.   Skin:     General: Skin is warm.   Neurological:      Mental Status: She is alert. Mental status is at baseline.   Psychiatric:         Speech: Speech normal.         Behavior: Behavior is withdrawn.          Assessment/Plan:     Diagnoses and all orders for this visit:    1. Positional lightheadedness (Primary)  -     Hemoglobin A1c    2. Other hypoglycemia  -     Hemoglobin A1c    3. Persistent migraine aura without cerebral infarction and without status migrainosus, not intractable      1-2.  Acute, uncontrolled.  Believe patient's positional lightheadedness likely due to orthostatic hypotension.  However orthostatic vitals obtained in office and were negative.  Lying down blood pressure was 102/70 and standing blood pressure was 116/74.  Counseled patient on keeping snacks handy as well as increasing water intake to maintain hydration status to help prevent positional lightheadedness.  Even though patient not technically hypoglycemic she is symptomatic so we will evaluate with hemoglobin A1c.  Concerned that patient has elevated glucose levels at baseline and that when her glucose levels are in the 70s this feels low to her.  Reviewed referral notes to endocrinology who said that patient would need 2 episodes of glucose levels less than 54 (not by fingerstick) before they would see her.    3.  Chronic, controlled.  Counseled patient to take Qulipta as prescribed as this prevents her migraines.  Counseled patient on setting a reminder on her phone so she would remember to take medicine.  Patient to continue  Nurtec as well as for abortive therapy as this helps decrease the severity of migraines.    I spent 40 minutes during this encounter including obtaining history, performing physical exam, reviewing previous notes in the chart, discussing with attending on patient care, and documenting encounter in the EHR.      Follow-up:     Return in about 3 months (around 7/14/2023) for Recheck Migraines, lightheadedness.    RISK SCORE: 1      This document has been electronically signed by Gautam Rivera MD on April 14, 2023 18:33 CDT

## 2023-04-14 NOTE — LETTER
April 14, 2023     Patient: Rubi Hennessy   YOB: 2000   Date of Visit: 4/14/2023       To Whom It May Concern:    It is my medical opinion that Rubi Hennessy may return to full duty immediately with no restrictions. If lightheadedness occurs, she is to drink a glass of water and eat a snack, which should improve symptoms.            Sincerely,        Gautam Rivera MD    CC: No Recipients

## 2023-04-21 NOTE — PROGRESS NOTES
I have reviewed the notes, assessments, and/or procedures performed by Gautam Rivera MD , I concur with documentation of Rubi Hennessy.

## 2025-04-26 NOTE — TELEPHONE ENCOUNTER
[facility] called and had a question about a script that Dr. Ascencio sent over. They would like a call back please.    Thank You,    Alma   1.49